# Patient Record
Sex: FEMALE | Race: WHITE | NOT HISPANIC OR LATINO | Employment: PART TIME | URBAN - METROPOLITAN AREA
[De-identification: names, ages, dates, MRNs, and addresses within clinical notes are randomized per-mention and may not be internally consistent; named-entity substitution may affect disease eponyms.]

---

## 2017-05-20 ENCOUNTER — ALLSCRIPTS OFFICE VISIT (OUTPATIENT)
Dept: OTHER | Facility: OTHER | Age: 52
End: 2017-05-20

## 2017-06-14 ENCOUNTER — APPOINTMENT (OUTPATIENT)
Dept: LAB | Facility: MEDICAL CENTER | Age: 52
End: 2017-06-14

## 2017-06-14 ENCOUNTER — APPOINTMENT (OUTPATIENT)
Dept: URGENT CARE | Facility: MEDICAL CENTER | Age: 52
End: 2017-06-14

## 2017-06-14 ENCOUNTER — TRANSCRIBE ORDERS (OUTPATIENT)
Dept: URGENT CARE | Facility: MEDICAL CENTER | Age: 52
End: 2017-06-14

## 2017-06-14 DIAGNOSIS — Z02.1 PHYSICAL EXAM, PRE-EMPLOYMENT: ICD-10-CM

## 2017-06-14 DIAGNOSIS — Z02.1 PHYSICAL EXAM, PRE-EMPLOYMENT: Primary | ICD-10-CM

## 2017-06-14 PROCEDURE — 36415 COLL VENOUS BLD VENIPUNCTURE: CPT

## 2017-06-14 PROCEDURE — 86787 VARICELLA-ZOSTER ANTIBODY: CPT

## 2017-06-14 PROCEDURE — 86480 TB TEST CELL IMMUN MEASURE: CPT

## 2017-06-15 LAB — VZV IGG SER IA-ACNC: NORMAL

## 2017-06-16 LAB
ANNOTATION COMMENT IMP: NORMAL
GAMMA INTERFERON BACKGROUND BLD IA-ACNC: 0.04 IU/ML
M TB IFN-G BLD-IMP: NEGATIVE
M TB IFN-G CD4+ BCKGRND COR BLD-ACNC: 0 IU/ML
M TB IFN-G CD4+ T-CELLS BLD-ACNC: 0.04 IU/ML
MITOGEN IGNF BLD-ACNC: 7.13 IU/ML
QUANTIFERON-TB GOLD IN TUBE: NORMAL
SERVICE CMNT-IMP: NORMAL

## 2018-01-14 VITALS
TEMPERATURE: 98.7 F | OXYGEN SATURATION: 96 % | WEIGHT: 194.31 LBS | RESPIRATION RATE: 16 BRPM | DIASTOLIC BLOOD PRESSURE: 82 MMHG | HEIGHT: 63 IN | BODY MASS INDEX: 34.43 KG/M2 | SYSTOLIC BLOOD PRESSURE: 118 MMHG | HEART RATE: 92 BPM

## 2018-01-16 NOTE — RESULT NOTES
Verified Results  (Q) HEMOGLOBIN A1c WITH eAG 18Oct2016 08:39AM Jose A Groves   REPORT COMMENT:  SPLIT 10/18/2016 FROM [id number]  FASTING:YES     Test Name Result Flag Reference   HEMOGLOBIN A1c 5 4 % of total Hgb  <5 7   According to ADA guidelines, hemoglobin A1c <7 0%  represents optimal control in non-pregnant diabetic  patients  Different metrics may apply to specific  patient populations  Standards of Medical Care in    Diabetes Care  2013;36:s11-s66     For the purpose of screening for the presence of  diabetes  <5 7%       Consistent with the absence of diabetes  5 7-6 4%    Consistent with increased risk for diabetes              (prediabetes)  >or=6 5%    Consistent with diabetes     This assay result is consistent with a decreased risk  of diabetes  Currently, no consensus exists for use of hemoglobin  A1c for diagnosis of diabetes for children  eAG (mg/dL) 108 (calc)     eAG (mmol/L) 6 0 (calc)       (Q) LIPID PANEL WITH REFLEX TO DIRECT LDL [id number] 08:39AM Jose A Groves     Test Name Result Flag Reference   CHOLESTEROL, TOTAL 202 mg/dL H 125-200   HDL CHOLESTEROL 46 mg/dL  > OR = 46   TRIGLICERIDES 959 mg/dL  <150   LDL-CHOLESTEROL 130 mg/dL (calc) H <130   Desirable range <100 mg/dL for patients with CHD or  diabetes and <70 mg/dL for diabetic patients with  known heart disease  CHOL/HDLC RATIO 4 4 (calc)  < OR = 5 0   NON HDL CHOLESTEROL 156 mg/dL (calc)     Target for non-HDL cholesterol is 30 mg/dL higher than   LDL cholesterol target  (Q) COMPREHENSIVE METABOLIC PNL W/ADJUSTED CALCIUM [id number] 08:39AM Jose A Groves     Test Name Result Flag Reference   GLUCOSE 130 mg/dL H 65-99   Fasting reference interval   UREA NITROGEN (BUN) 12 mg/dL  7-25   CREATININE 0 77 mg/dL  0 50-1 10   eGFR NON-AFR   AMERICAN 91 mL/min/1 73m2  > OR = 60   eGFR AFRICAN AMERICAN 105 mL/min/1 73m2  > OR = 60   BUN/CREATININE RATIO   1-40   NOT APPLICABLE (calc)   SODIUM 139 mmol/L  135-146   POTASSIUM 4 4 mmol/L  3 5-5 3   CHLORIDE 104 mmol/L     CARBON DIOXIDE 25 mmol/L  20-31   CALCIUM 9 1 mg/dL  8 6-10 2   CALCIUM (ADJUSTED FOR$ALBUMIN) 9 7 mg/dL (calc)  8 6-10 2   PROTEIN, TOTAL 6 5 g/dL  6 1-8 1   ALBUMIN 3 5 g/dL L 3 6-5 1   GLOBULIN 3 0 g/dL (calc)  1 9-3 7   ALBUMIN/GLOBULIN RATIO 1 2 (calc)  1 0-2 5   BILIRUBIN, TOTAL 0 8 mg/dL  0 2-1 2   ALKALINE PHOSPHATASE 71 U/L     AST 14 U/L  10-35   ALT 18 U/L  6-29     (Q) TSH, 3RD GENERATION W/REFLEX TO FT4 18Oct2016 08:39 [employer]     Test Name Result Flag Reference   TSH W/REFLEX TO FT4 3 35 mIU/L     Reference Range                         > or = 20 Years  0 40-4 50                              Pregnancy Ranges            First trimester    0 26-2 66            Second trimester   0 55-2 73            Third trimester    0 43-2 91     (Q) CBC (INCLUDES DIFF/PLT) (REFL) [id number] 08:39AM [employer]     Test Name Result Flag Reference   WHITE BLOOD CELL COUNT 6 3 Thousand/uL  3 8-10 8   RED BLOOD CELL COUNT 5 12 Million/uL H 3 80-5 10   HEMOGLOBIN 15 7 g/dL H 11 7-15 5   HEMATOCRIT 47 3 % H 35 0-45 0   MCV 92 4 fL  80 0-100 0   MCH 30 6 pg  27 0-33 0   MCHC 33 2 g/dL  32 0-36 0   RDW 13 5 %  11 0-15 0   PLATELET COUNT 379 Thousand/uL  140-400   MPV 9 6 fL  7 5-11 5   ABSOLUTE NEUTROPHILS 4442 cells/uL  4335-4142   ABSOLUTE LYMPHOCYTES 1361 cells/uL  850-3900   ABSOLUTE MONOCYTES 340 cells/uL  200-950   ABSOLUTE EOSINOPHILS 132 cells/uL     ABSOLUTE BASOPHILS 25 cells/uL  0-200   NEUTROPHILS 70 5 %     LYMPHOCYTES 21 6 %     MONOCYTES 5 4 %     EOSINOPHILS 2 1 %     BASOPHILS 0 4 %       (1) OP DAYANA FERRITIN [id number] 08:39 [employer]     Test Name Result Flag Reference   FERRITIN 41 ng/mL

## 2018-03-27 RX ORDER — LEVOTHYROXINE SODIUM 0.05 MG/1
1 TABLET ORAL DAILY
COMMUNITY
Start: 2012-12-28 | End: 2018-03-28 | Stop reason: SDUPTHER

## 2018-03-27 RX ORDER — LEVOTHYROXINE SODIUM 0.05 MG/1
50 TABLET ORAL DAILY
Qty: 30 TABLET | Refills: 0 | Status: CANCELLED | OUTPATIENT
Start: 2018-03-27

## 2018-03-28 DIAGNOSIS — E03.9 HYPOTHYROIDISM, UNSPECIFIED TYPE: Primary | ICD-10-CM

## 2018-03-28 RX ORDER — LEVOTHYROXINE SODIUM 0.05 MG/1
50 TABLET ORAL DAILY
Qty: 30 TABLET | Refills: 3 | Status: SHIPPED | OUTPATIENT
Start: 2018-03-28 | End: 2018-09-04 | Stop reason: SDUPTHER

## 2018-05-22 ENCOUNTER — OFFICE VISIT (OUTPATIENT)
Dept: OBGYN CLINIC | Facility: MEDICAL CENTER | Age: 53
End: 2018-05-22
Payer: COMMERCIAL

## 2018-05-22 VITALS — WEIGHT: 201 LBS | DIASTOLIC BLOOD PRESSURE: 76 MMHG | SYSTOLIC BLOOD PRESSURE: 110 MMHG | BODY MASS INDEX: 36.18 KG/M2

## 2018-05-22 DIAGNOSIS — N95.1 PERIMENOPAUSE: ICD-10-CM

## 2018-05-22 DIAGNOSIS — Z12.4 ENCOUNTER FOR PAPANICOLAOU SMEAR FOR CERVICAL CANCER SCREENING: ICD-10-CM

## 2018-05-22 DIAGNOSIS — Z11.51 SCREENING FOR HPV (HUMAN PAPILLOMAVIRUS): ICD-10-CM

## 2018-05-22 DIAGNOSIS — Z01.419 ENCOUNTER FOR WELL WOMAN EXAM WITH ROUTINE GYNECOLOGICAL EXAM: Primary | ICD-10-CM

## 2018-05-22 DIAGNOSIS — Z12.39 SCREENING FOR MALIGNANT NEOPLASM OF BREAST: ICD-10-CM

## 2018-05-22 PROCEDURE — 87624 HPV HI-RISK TYP POOLED RSLT: CPT | Performed by: OBSTETRICS & GYNECOLOGY

## 2018-05-22 PROCEDURE — G0145 SCR C/V CYTO,THINLAYER,RESCR: HCPCS | Performed by: OBSTETRICS & GYNECOLOGY

## 2018-05-22 PROCEDURE — S0610 ANNUAL GYNECOLOGICAL EXAMINA: HCPCS | Performed by: OBSTETRICS & GYNECOLOGY

## 2018-05-22 NOTE — PROGRESS NOTES
ASSESSMENT & PLAN: Erich Shankar is a 47 y o  [id number] with normal gynecologic exam     1   Routine well woman exam done today  2  Pap and HPV:  The patient's Pap and cotesting was done today  Current ASCCP Guidelines reviewed  Last done   3  Mammogram ordered  4  Colonoscopy - needed  5  The following were reviewed in today's visit: breast self exam and mammography screening ordered      CC:  Annual Gynecologic Examination    HPI: Erich Shankar is a 47 y o  [id number] who presents for annual gynecologic examination  She has the following concerns:  No GYN complaints, perimenopausal  Cycles every 2-3 months  Occasional hot flashes and night sweats  Tolerating well  Health Maintenance:    She wears her seatbelt routinely  She does perform irregular monthly self breast exams  She feels safe at home  Pap:   Last mammogram: unsure  Last colonoscopy: never    Past Medical History:   Diagnosis Date    Allergic rhinitis     Last Assessed:2012    Myxedema     Last Assessed:2013       History reviewed  No pertinent surgical history  Past OB/Gyn History:  OB History      Para Term  AB Living    2 2 2     2    SAB TAB Ectopic Multiple Live Births            2        Pt does not have menstrual issues  perimenopause   History of sexually transmitted infection: No   History of abnormal pap smears: No      Patient is currently sexually active  heterosexual   The current method of family planning is vasectomy  Family History   Problem Relation Age of Onset    No Known Problems Mother     No Known Problems Father        Social History:  Social History     Social History    Marital status: /Civil Union     Spouse name: N/A    Number of children: N/A    Years of education: N/A     Occupational History    Not on file       Social History Main Topics    Smoking status: Never Smoker    Smokeless tobacco: Never Used    Alcohol use Yes    Drug use: No    Sexual activity: Yes     Partners: Male     Birth control/ protection: Male Sterilization     Other Topics Concern    Not on file     Social History Narrative    No narrative on file     Presently lives with   Patient is currently employed [employer]  Allergies   Allergen Reactions    Codeine GI Intolerance    Tramadol GI Intolerance       Current Outpatient Prescriptions:     levothyroxine 50 mcg tablet, Take 1 tablet (50 mcg total) by mouth daily, Disp: 30 tablet, Rfl: 3      Review of Systems  Constitutional :no fever, feels well, no tiredness, no recent weight gain or loss  ENT: no ear ache, no loss of hearing, no nosebleeds or nasal discharge, no sore throat or hoarseness  Cardiovascular: no complaints of slow or fast heart beat, no chest pain, no palpitations, no leg claudication or lower extremity edema  Respiratory: no complaints of shortness of shortness of breath, no MORENO  Breasts:no complaints of breast pain, breast lump, or nipple discharge  Gastrointestinal: no complaints of abdominal pain, constipation, nausea, vomiting, or diarrhea or bloody stools  Genitourinary : no complaints of dysuria, incontinence, pelvic pain, no dysmenorrhea, vaginal discharge or abnormal vaginal bleeding and as noted in HPI  Musculoskeletal: no complaints of arthralgia, no myalgia, no joint swelling or stiffness, no limb pain or swelling    Integumentary: no complaints of skin rash or lesion, itching or dry skin  Neurological: no complaints of headache, no confusion, no numbness or tingling, no dizziness or fainting    Objective      /76   Wt 91 2 kg (201 lb)   LMP 03/13/2018   BMI 36 18 kg/m²     General:   appears stated age, cooperative, alert normal mood and affect   Neck: normal, supple,trachea midline, no masses   Heart: regular rate and rhythm, S1, S2 normal, no murmur, click, rub or gallop   Lungs: clear to auscultation bilaterally   Breasts: normal appearance, no masses or tenderness, Inspection negative, No nipple retraction or dimpling, No nipple discharge or bleeding, No axillary or supraclavicular adenopathy   Abdomen: soft, non-tender, without masses or organomegaly   Vulva: normal, normal female genitalia, Bartholin's, Urethra, Underwood normal, no lesions   Vagina: normal vagina, no discharge, exudate, lesion, or erythema   Urethra: normal   Cervix: Normal, no discharge  PAP done  Uterus: normal size, contour, position, consistency, mobility, non-tender   Adnexa: normal adnexa and no mass, fullness, tenderness   Lymphatic palpation of lymph nodes in neck, axilla, groin and/or other locations: no lymphadenopathy or masses noted   Skin normal skin turgor and no rashes     Psychiatric orientation to person, place, and time: normal  mood and affect: normal

## 2018-05-24 LAB
HPV RRNA GENITAL QL NAA+PROBE: NORMAL
LAB AP GYN PRIMARY INTERPRETATION: NORMAL
Lab: NORMAL

## 2018-06-14 ENCOUNTER — HOSPITAL ENCOUNTER (OUTPATIENT)
Dept: MAMMOGRAPHY | Facility: MEDICAL CENTER | Age: 53
Discharge: HOME | End: 2018-06-14
Payer: COMMERCIAL

## 2018-06-14 DIAGNOSIS — Z12.39 SCREENING FOR MALIGNANT NEOPLASM OF BREAST: ICD-10-CM

## 2018-06-14 PROCEDURE — 77067 SCR MAMMO BI INCL CAD: CPT

## 2018-06-14 PROCEDURE — 77063 BREAST TOMOSYNTHESIS BI: CPT

## 2018-09-04 DIAGNOSIS — R73.9 ELEVATED BLOOD SUGAR: ICD-10-CM

## 2018-09-04 DIAGNOSIS — E78.2 MIXED HYPERLIPIDEMIA: ICD-10-CM

## 2018-09-04 DIAGNOSIS — E03.9 ACQUIRED HYPOTHYROIDISM: Primary | ICD-10-CM

## 2018-09-04 DIAGNOSIS — E03.9 HYPOTHYROIDISM, UNSPECIFIED TYPE: ICD-10-CM

## 2018-09-04 RX ORDER — LEVOTHYROXINE SODIUM 0.05 MG/1
50 TABLET ORAL DAILY
Qty: 90 TABLET | Refills: 1 | Status: SHIPPED | OUTPATIENT
Start: 2018-09-04 | End: 2019-07-18 | Stop reason: SDUPTHER

## 2018-09-04 NOTE — TELEPHONE ENCOUNTER
CALL PATIENT  I DO NOT SEE THAT SHE HAS HAD FASTING BLOOD WORK OR A TSH IN THE PAST 6 MONTHS  I WILL PUT ORDERS IN AND THEN SHE CAN MAKE AN APPOINTMENT TO SEE ME AFTER SHE HAS THE BLOOD WORK

## 2018-11-19 ENCOUNTER — OFFICE VISIT (OUTPATIENT)
Dept: FAMILY MEDICINE CLINIC | Facility: CLINIC | Age: 53
End: 2018-11-19
Payer: COMMERCIAL

## 2018-11-19 VITALS
OXYGEN SATURATION: 98 % | SYSTOLIC BLOOD PRESSURE: 144 MMHG | DIASTOLIC BLOOD PRESSURE: 98 MMHG | WEIGHT: 208.5 LBS | HEART RATE: 106 BPM | HEIGHT: 63 IN | TEMPERATURE: 98.7 F | BODY MASS INDEX: 36.94 KG/M2

## 2018-11-19 DIAGNOSIS — T16.1XXA FOREIGN BODY OF RIGHT EAR, INITIAL ENCOUNTER: Primary | ICD-10-CM

## 2018-11-19 DIAGNOSIS — Z12.11 SCREENING FOR COLON CANCER: ICD-10-CM

## 2018-11-19 PROCEDURE — 3008F BODY MASS INDEX DOCD: CPT | Performed by: PHYSICIAN ASSISTANT

## 2018-11-19 PROCEDURE — 99213 OFFICE O/P EST LOW 20 MIN: CPT | Performed by: PHYSICIAN ASSISTANT

## 2018-11-19 PROCEDURE — 1036F TOBACCO NON-USER: CPT | Performed by: PHYSICIAN ASSISTANT

## 2018-11-19 RX ORDER — CHLORAL HYDRATE 500 MG
1000 CAPSULE ORAL DAILY
COMMUNITY

## 2018-11-19 RX ORDER — LANOLIN ALCOHOL/MO/W.PET/CERES
1 CREAM (GRAM) TOPICAL 2 TIMES DAILY
COMMUNITY

## 2018-11-19 RX ORDER — MULTIVITAMIN
1 CAPSULE ORAL DAILY
COMMUNITY

## 2019-03-27 ENCOUNTER — OFFICE VISIT (OUTPATIENT)
Dept: FAMILY MEDICINE CLINIC | Facility: CLINIC | Age: 54
End: 2019-03-27
Payer: COMMERCIAL

## 2019-03-27 VITALS
OXYGEN SATURATION: 98 % | TEMPERATURE: 98.1 F | SYSTOLIC BLOOD PRESSURE: 130 MMHG | HEART RATE: 96 BPM | RESPIRATION RATE: 16 BRPM | DIASTOLIC BLOOD PRESSURE: 86 MMHG
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
## 2019-03-27 PROCEDURE — 87205 SMEAR GRAM STAIN: CPT | Performed by: FAMILY MEDICINE

## 2019-03-27 PROCEDURE — 99213 OFFICE O/P EST LOW 20 MIN: CPT | Performed by: FAMILY MEDICINE

## 2019-03-27 PROCEDURE — 87070 CULTURE OTHR SPECIMN AEROBIC: CPT | Performed by: FAMILY MEDICINE

## 2019-03-27 RX ORDER — CEPHALEXIN 500 MG/1
500 CAPSULE ORAL EVERY 12 HOURS SCHEDULED
Qty: 20 CAPSULE | Refills: 0 | Status: SHIPPED | OUTPATIENT
Start: 2019-03-27 | End: 2019-04-06

## 2019-03-27 NOTE — PROGRESS NOTES
Assessment/Plan:  Patient is a 56-year-old female seen for a dog bite  Patient was been about 10 days ago by a neighbor's dog  She and her  were out for a walk and the dog came from behind the fence  Patient started walking towards the dog's owner and she believes in attempt to protect his own her, the dog bit her hand  She has a small open area approximately 6 mm in diameter is with slight yellowish drainage and erythema surrounding it  I did send out a culture and I started patient on antibiotics  Diagnoses and all orders for this visit:    Dog bite, initial encounter  -     cephalexin (KEFLEX) 500 mg capsule; Take 1 capsule (500 mg total) by mouth every 12 (twelve) hours for 10 days  -     Wound culture and Gram stain          Subjective:      Patient ID: Robyn Banegas is a 47 y o  female  Patient was walking and a dog got out from fence  Dog bit her on 3/17  Her hand was just red  The following portions of the patient's history were reviewed and updated as appropriate: allergies, current medications, past family history, past medical history, past social history, past surgical history and problem list     Review of Systems   Constitutional: Negative for chills and fever  Skin: Positive for wound  Neurological: Negative for weakness and numbness  Objective:      /86 (BP Location: Left arm, Patient Position: Sitting)   Pulse 96   Temp 98 1 °F (36 7 °C) (Tympanic)   Resp 16   LMP 03/13/2018   SpO2 98%          Physical Exam   Constitutional: She is oriented to person, place, and time  No distress  Musculoskeletal: Normal range of motion  She exhibits tenderness (Over open wound)  Neurological: She is alert and oriented to person, place, and time  Skin: Skin is warm and dry  Open area on left leg  Some drainage with erythema around it  Psychiatric: She has a normal mood and affect  Nursing note and vitals reviewed

## 2019-03-30 LAB
BACTERIA WND AEROBE CULT: NO GROWTH
GRAM STN SPEC: NORMAL
GRAM STN SPEC: NORMAL

## 2019-04-17 ENCOUNTER — TELEPHONE (OUTPATIENT)
Dept: FAMILY MEDICINE CLINIC | Facility: CLINIC | Age: 54
End: 2019-04-17

## 2019-04-18 DIAGNOSIS — N95.1 PERIMENOPAUSE: Primary | ICD-10-CM

## 2019-04-18 DIAGNOSIS — R53.83 FATIGUE, UNSPECIFIED TYPE: ICD-10-CM

## 2019-04-24 ENCOUNTER — TRANSCRIBE ORDERS (OUTPATIENT)
Dept: LAB | Facility: HOSPITAL | Age: 54
End: 2019-04-24

## 2019-04-24 DIAGNOSIS — R73.9 BLOOD GLUCOSE ELEVATED: Primary | ICD-10-CM

## 2019-05-03 ENCOUNTER — APPOINTMENT (OUTPATIENT)
Dept: LAB | Facility: HOSPITAL | Age: 54
End: 2019-05-03
Payer: COMMERCIAL

## 2019-05-03 DIAGNOSIS — R73.9 BLOOD GLUCOSE ELEVATED: ICD-10-CM

## 2019-05-03 DIAGNOSIS — N95.1 PERIMENOPAUSE: ICD-10-CM

## 2019-05-03 DIAGNOSIS — E78.2 MIXED HYPERLIPIDEMIA: ICD-10-CM

## 2019-05-03 DIAGNOSIS — R53.83 FATIGUE, UNSPECIFIED TYPE: ICD-10-CM

## 2019-05-03 DIAGNOSIS — E03.9 ACQUIRED HYPOTHYROIDISM: ICD-10-CM

## 2019-05-03 DIAGNOSIS — R73.9 ELEVATED BLOOD SUGAR: ICD-10-CM

## 2019-05-03 LAB
25(OH)D3 SERPL-MCNC: 29 NG/ML (ref 30–100)
ALBUMIN SERPL BCP-MCNC: 3.3 G/DL (ref 3.5–5)
ALP SERPL-CCNC: 101 U/L (ref 46–116)
ALT SERPL W P-5'-P-CCNC: 32 U/L (ref 12–78)
ANION GAP SERPL CALCULATED.3IONS-SCNC: 9 MMOL/L (ref 4–13)
AST SERPL W P-5'-P-CCNC: 21 U/L (ref 5–45)
BASOPHILS # BLD AUTO: 0.04 THOUSANDS/ΜL (ref 0–0.1)
BASOPHILS NFR BLD AUTO: 1 % (ref 0–1)
BILIRUB SERPL-MCNC: 1.21 MG/DL (ref 0.2–1)
BUN SERPL-MCNC: 10 MG/DL (ref 5–25)
CALCIUM SERPL-MCNC: 8.8 MG/DL (ref 8.3–10.1)
CHLORIDE SERPL-SCNC: 107 MMOL/L (ref 100–108)
CHOLEST SERPL-MCNC: 255 MG/DL (ref 50–200)
CO2 SERPL-SCNC: 24 MMOL/L (ref 21–32)
CREAT SERPL-MCNC: 0.81 MG/DL (ref 0.6–1.3)
EOSINOPHIL # BLD AUTO: 0.14 THOUSAND/ΜL (ref 0–0.61)
EOSINOPHIL NFR BLD AUTO: 2 % (ref 0–6)
ERYTHROCYTE [DISTWIDTH] IN BLOOD BY AUTOMATED COUNT: 13.6 % (ref 11.6–15.1)
EST. AVERAGE GLUCOSE BLD GHB EST-MCNC: 111 MG/DL
GFR SERPL CREATININE-BSD FRML MDRD: 84 ML/MIN/1.73SQ M
GLUCOSE SERPL-MCNC: 101 MG/DL (ref 65–140)
HBA1C MFR BLD: 5.5 % (ref 4.2–6.3)
HCT VFR BLD AUTO: 48.6 % (ref 34.8–46.1)
HDLC SERPL-MCNC: 50 MG/DL (ref 40–60)
HGB BLD-MCNC: 16.1 G/DL (ref 11.5–15.4)
IMM GRANULOCYTES # BLD AUTO: 0.02 THOUSAND/UL (ref 0–0.2)
IMM GRANULOCYTES NFR BLD AUTO: 0 % (ref 0–2)
LDLC SERPL CALC-MCNC: 182 MG/DL (ref 0–100)
LYMPHOCYTES # BLD AUTO: 1.52 THOUSANDS/ΜL (ref 0.6–4.47)
LYMPHOCYTES NFR BLD AUTO: 24 % (ref 14–44)
MCH RBC QN AUTO: 30.3 PG (ref 26.8–34.3)
MCHC RBC AUTO-ENTMCNC: 33.1 G/DL (ref 31.4–37.4)
MCV RBC AUTO: 91 FL (ref 82–98)
MONOCYTES # BLD AUTO: 0.44 THOUSAND/ΜL (ref 0.17–1.22)
MONOCYTES NFR BLD AUTO: 7 % (ref 4–12)
NEUTROPHILS # BLD AUTO: 4.06 THOUSANDS/ΜL (ref 1.85–7.62)
NEUTS SEG NFR BLD AUTO: 66 % (ref 43–75)
NRBC BLD AUTO-RTO: 0 /100 WBCS
PLATELET # BLD AUTO: 303 THOUSANDS/UL (ref 149–390)
PMV BLD AUTO: 11 FL (ref 8.9–12.7)
POTASSIUM SERPL-SCNC: 3.7 MMOL/L (ref 3.5–5.3)
PROT SERPL-MCNC: 7.4 G/DL (ref 6.4–8.2)
RBC # BLD AUTO: 5.32 MILLION/UL (ref 3.81–5.12)
SODIUM SERPL-SCNC: 140 MMOL/L (ref 136–145)
TRIGL SERPL-MCNC: 115 MG/DL
TSH SERPL DL<=0.05 MIU/L-ACNC: 2.5 UIU/ML (ref 0.36–3.74)
VENIPUNCTURE: NORMAL
WBC # BLD AUTO: 6.22 THOUSAND/UL (ref 4.31–10.16)

## 2019-05-03 PROCEDURE — 84443 ASSAY THYROID STIM HORMONE: CPT

## 2019-05-03 PROCEDURE — 36415 COLL VENOUS BLD VENIPUNCTURE: CPT

## 2019-05-03 PROCEDURE — 82306 VITAMIN D 25 HYDROXY: CPT

## 2019-05-03 PROCEDURE — 80053 COMPREHEN METABOLIC PANEL: CPT

## 2019-05-03 PROCEDURE — 83036 HEMOGLOBIN GLYCOSYLATED A1C: CPT

## 2019-05-03 PROCEDURE — 80061 LIPID PANEL: CPT

## 2019-05-03 PROCEDURE — 85025 COMPLETE CBC W/AUTO DIFF WBC: CPT

## 2019-05-14 ENCOUNTER — OFFICE VISIT (OUTPATIENT)
Dept: FAMILY MEDICINE CLINIC | Facility: CLINIC | Age: 54
End: 2019-05-14
Payer: COMMERCIAL

## 2019-05-14 VITALS
RESPIRATION RATE: 16 BRPM | WEIGHT: 204.2 LBS | HEIGHT: 63 IN | OXYGEN SATURATION: 98 % | HEART RATE: 88 BPM | DIASTOLIC BLOOD PRESSURE: 88 MMHG | SYSTOLIC BLOOD PRESSURE: 136 MMHG | BODY MASS INDEX: 36.18 KG/M2 | TEMPERATURE: 79.9 F

## 2019-05-14 DIAGNOSIS — M54.50 ACUTE BILATERAL LOW BACK PAIN WITHOUT SCIATICA: ICD-10-CM

## 2019-05-14 DIAGNOSIS — R31.29 OTHER MICROSCOPIC HEMATURIA: ICD-10-CM

## 2019-05-14 DIAGNOSIS — R10.9 RIGHT FLANK PAIN: Primary | ICD-10-CM

## 2019-05-14 DIAGNOSIS — R10.9 ABDOMINAL CRAMPING: ICD-10-CM

## 2019-05-14 LAB
SL AMB  POCT GLUCOSE, UA: ABNORMAL
SL AMB LEUKOCYTE ESTERASE,UA: ABNORMAL
SL AMB POCT BILIRUBIN,UA: ABNORMAL
SL AMB POCT BLOOD,UA: ABNORMAL
SL AMB POCT CLARITY,UA: CLEAR
SL AMB POCT COLOR,UA: YELLOW
SL AMB POCT KETONES,UA: ABNORMAL
SL AMB POCT NITRITE,UA: ABNORMAL
SL AMB POCT PH,UA: 7
SL AMB POCT SPECIFIC GRAVITY,UA: 1.01
SL AMB POCT URINE PROTEIN: ABNORMAL
SL AMB POCT UROBILINOGEN: 0.2

## 2019-05-14 PROCEDURE — 81003 URINALYSIS AUTO W/O SCOPE: CPT | Performed by: FAMILY MEDICINE

## 2019-05-14 PROCEDURE — 99213 OFFICE O/P EST LOW 20 MIN: CPT | Performed by: FAMILY MEDICINE

## 2019-05-17 ENCOUNTER — HOSPITAL ENCOUNTER (OUTPATIENT)
Dept: RADIOLOGY | Facility: IMAGING CENTER | Age: 54
Discharge: HOME | End: 2019-05-17
Payer: COMMERCIAL

## 2019-05-17 DIAGNOSIS — R31.29 OTHER MICROSCOPIC HEMATURIA: ICD-10-CM

## 2019-05-17 DIAGNOSIS — R10.9 RIGHT FLANK PAIN: ICD-10-CM

## 2019-05-17 PROCEDURE — 74176 CT ABD & PELVIS W/O CONTRAST: CPT

## 2019-05-23 ENCOUNTER — TELEPHONE (OUTPATIENT)
Dept: FAMILY MEDICINE CLINIC | Facility: CLINIC | Age: 54
End: 2019-05-23

## 2019-06-03 ENCOUNTER — OFFICE VISIT (OUTPATIENT)
Dept: FAMILY MEDICINE CLINIC | Facility: CLINIC | Age: 54
End: 2019-06-03
Payer: COMMERCIAL

## 2019-06-03 VITALS
WEIGHT: 204 LBS | RESPIRATION RATE: 17 BRPM | TEMPERATURE: 98.1 F | HEART RATE: 94 BPM | OXYGEN SATURATION: 96 % | BODY MASS INDEX: 36.14 KG/M2 | HEIGHT: 63 IN | DIASTOLIC BLOOD PRESSURE: 86 MMHG | SYSTOLIC BLOOD PRESSURE: 142 MMHG

## 2019-06-03 DIAGNOSIS — J01.90 ACUTE BACTERIAL SINUSITIS: Primary | ICD-10-CM

## 2019-06-03 DIAGNOSIS — B96.89 ACUTE BACTERIAL SINUSITIS: Primary | ICD-10-CM

## 2019-06-03 PROCEDURE — 3008F BODY MASS INDEX DOCD: CPT | Performed by: FAMILY MEDICINE

## 2019-06-03 PROCEDURE — 99213 OFFICE O/P EST LOW 20 MIN: CPT | Performed by: FAMILY MEDICINE

## 2019-06-03 PROCEDURE — 1036F TOBACCO NON-USER: CPT | Performed by: FAMILY MEDICINE

## 2019-06-03 RX ORDER — CEFDINIR 300 MG/1
300 CAPSULE ORAL EVERY 12 HOURS SCHEDULED
Qty: 20 CAPSULE | Refills: 0 | Status: SHIPPED | OUTPATIENT
Start: 2019-06-03 | End: 2019-06-13

## 2019-06-03 RX ORDER — PREDNISONE 10 MG/1
TABLET ORAL
Qty: 20 TABLET | Refills: 0 | Status: SHIPPED | OUTPATIENT
Start: 2019-06-03 | End: 2019-07-16 | Stop reason: ALTCHOICE

## 2019-07-16 ENCOUNTER — OFFICE VISIT (OUTPATIENT)
Dept: URGENT CARE | Facility: CLINIC | Age: 54
End: 2019-07-16
Payer: COMMERCIAL

## 2019-07-16 VITALS
DIASTOLIC BLOOD PRESSURE: 88 MMHG | HEART RATE: 90 BPM | SYSTOLIC BLOOD PRESSURE: 152 MMHG | RESPIRATION RATE: 18 BRPM | OXYGEN SATURATION: 98 % | TEMPERATURE: 98.3 F

## 2019-07-16 DIAGNOSIS — Z12.12 SCREENING FOR COLORECTAL CANCER: ICD-10-CM

## 2019-07-16 DIAGNOSIS — Z12.11 SCREENING FOR COLORECTAL CANCER: ICD-10-CM

## 2019-07-16 DIAGNOSIS — N30.00 ACUTE CYSTITIS WITHOUT HEMATURIA: Primary | ICD-10-CM

## 2019-07-16 LAB
SL AMB  POCT GLUCOSE, UA: NEGATIVE
SL AMB LEUKOCYTE ESTERASE,UA: ABNORMAL
SL AMB POCT BILIRUBIN,UA: NEGATIVE
SL AMB POCT BLOOD,UA: ABNORMAL
SL AMB POCT CLARITY,UA: CLEAR
SL AMB POCT COLOR,UA: YELLOW
SL AMB POCT KETONES,UA: NEGATIVE
SL AMB POCT NITRITE,UA: NEGATIVE
SL AMB POCT PH,UA: 6
SL AMB POCT SPECIFIC GRAVITY,UA: 1.01
SL AMB POCT URINE PROTEIN: NEGATIVE
SL AMB POCT UROBILINOGEN: 0.2

## 2019-07-16 PROCEDURE — 81002 URINALYSIS NONAUTO W/O SCOPE: CPT | Performed by: PHYSICIAN ASSISTANT

## 2019-07-16 PROCEDURE — 87086 URINE CULTURE/COLONY COUNT: CPT | Performed by: PHYSICIAN ASSISTANT

## 2019-07-16 PROCEDURE — 99213 OFFICE O/P EST LOW 20 MIN: CPT | Performed by: PHYSICIAN ASSISTANT

## 2019-07-16 RX ORDER — CEPHALEXIN 500 MG/1
500 CAPSULE ORAL EVERY 12 HOURS SCHEDULED
Qty: 21 CAPSULE | Refills: 0 | Status: SHIPPED | COMMUNITY
Start: 2019-07-16 | End: 2019-07-18 | Stop reason: ALTCHOICE

## 2019-07-16 NOTE — PROGRESS NOTES
[facility]        NAME: Justyna Sanford is a 47 y o  female  : 1965    MRN: [id number]  DATE: 2019  TIME: 5:27 PM    Assessment and Plan   Acute cystitis without hematuria [N30 00]  1  Acute cystitis without hematuria  POCT urine dip    Urine culture    cephalexin (KEFLEX) 500 mg capsule   2  Screening for colorectal cancer  Occult Blood, Fecal Immunochemical (FIT)    CANCELED: Occult Blood, Fecal Immunochemical (FIT)         Patient Instructions      take Keflex as directed until completed   Motrin and/or Tylenol as needed for fevers aches and pains   drink plenty of fluids and stay well hydrated  Follow up with PCP in 3-5 days  Proceed to  ER if symptoms worsen  Chief Complaint     Chief Complaint   Patient presents with    Possible UTI     Patient states that she has pelvic pressure, urinary frequency  Suprapubic discomfort  Bilat low back pain  Has appt with pcp in 2 days  History of Present Illness        60-year-old female presents with several day history of lower pelvic pain urinary frequency urgency and some diffuse lower back pain  Denies any fevers or chills  No chest pain shortness of breath or coughing  No nausea vomiting or diarrhea  Urinary Frequency    This is a new problem  The current episode started in the past 7 days  The problem occurs every urination  The problem has been waxing and waning  The quality of the pain is described as burning  The pain is mild  There has been no fever  There is no history of pyelonephritis  Associated symptoms include frequency and urgency  Pertinent negatives include no discharge, hematuria, hesitancy, nausea or vomiting  She has tried nothing for the symptoms  The treatment provided no relief  Review of Systems   Review of Systems   Constitutional: Negative  HENT: Negative  Eyes: Negative  Respiratory: Negative  Cardiovascular: Negative  Gastrointestinal: Negative    Negative for nausea and vomiting  Genitourinary: Positive for frequency and urgency  Negative for hematuria and hesitancy  Musculoskeletal: Negative  Skin: Negative  Neurological: Negative  Current Medications       Current Outpatient Medications:     calcium citrate-vitamin D (CITRACAL+D) 315-200 MG-UNIT per tablet, Take 1 tablet by mouth 2 (two) times a day, Disp: , Rfl:     levothyroxine 50 mcg tablet, Take 1 tablet (50 mcg total) by mouth daily, Disp: 90 tablet, Rfl: 1    Multiple Vitamin (MULTIVITAMIN) capsule, Take 1 capsule by mouth daily, Disp: , Rfl:     Omega-3 Fatty Acids (FISH OIL) 1,000 mg, Take 1,000 mg by mouth daily, Disp: , Rfl:     cephalexin (KEFLEX) 500 mg capsule, Take 1 capsule (500 mg total) by mouth every 12 (twelve) hours for 7 days, Disp: 21 capsule, Rfl: 0    Current Allergies     Allergies as of 07/16/2019 - Reviewed 07/16/2019   Allergen Reaction Noted    Codeine GI Intolerance 12/08/2015    Tramadol GI Intolerance 08/01/2013            The following portions of the patient's history were reviewed and updated as appropriate: allergies, current medications, past family history, past medical history, past social history, past surgical history and problem list      Past Medical History:   Diagnosis Date    Allergic rhinitis     Last Assessed:9/13/2012    Myxedema     Last Assessed:1/8/2013       History reviewed  No pertinent surgical history  Family History   Problem Relation Age of Onset    No Known Problems Mother     No Known Problems Father          Medications have been verified  Objective   /88   Pulse 90   Temp 98 3 °F (36 8 °C) (Tympanic)   Resp 18   LMP 03/13/2018   SpO2 98%        Physical Exam     Physical Exam   Constitutional: She is oriented to person, place, and time  She appears well-developed and well-nourished  No distress  HENT:   Head: Normocephalic and atraumatic     Right Ear: External ear normal    Left Ear: External ear normal    Nose: Nose normal    Mouth/Throat: Oropharynx is clear and moist  No oropharyngeal exudate  Eyes: Conjunctivae and EOM are normal  Right eye exhibits no discharge  Left eye exhibits no discharge  Neck: Normal range of motion  Neck supple  Cardiovascular: Normal rate, regular rhythm, normal heart sounds and intact distal pulses  No murmur heard  Pulmonary/Chest: Effort normal and breath sounds normal  No respiratory distress  She has no wheezes  She has no rales  Abdominal: Soft  Bowel sounds are normal  There is tenderness ( mild to moderate) in the suprapubic area  There is no rigidity, no rebound, no guarding, no CVA tenderness, no tenderness at McBurney's point and negative Dennis's sign  Musculoskeletal: Normal range of motion  Lymphadenopathy:     She has no cervical adenopathy  Neurological: She is alert and oriented to person, place, and time  Skin: Skin is warm and dry  Psychiatric: She has a normal mood and affect  Nursing note and vitals reviewed

## 2019-07-16 NOTE — PATIENT INSTRUCTIONS
take Keflex as directed until completed   Motrin and/or Tylenol as needed for fevers aches and pains   drink plenty of fluids and stay well hydrated  Follow up with PCP in 3-5 days  Proceed to  ER if symptoms worsen  Urinary Tract Infection in Women   AMBULATORY CARE:   A urinary tract infection (UTI)  is caused by bacteria that get inside your urinary tract  Most bacteria that enter your urinary tract come out when you urinate  If the bacteria stay in your urinary tract, you may get an infection  Your urinary tract includes your kidneys, ureters, bladder, and urethra  Urine is made in your kidneys, and it flows from the ureters to the bladder  Urine leaves the bladder through the urethra  A UTI is more common in your lower urinary tract, which includes your bladder and urethra  Common symptoms include the following:   · Urinating more often or waking from sleep to urinate    · Pain or burning when you urinate    · Pain or pressure in your lower abdomen     · Urine that smells bad    · Blood in your urine    · Leaking urine  Seek care immediately if:   · You are urinating very little or not at all  · You have a high fever with shaking chills  · You have side or back pain that gets worse  Contact your healthcare provider if:   · You have a fever  · You do not feel better after 2 days of taking antibiotics  · You are vomiting  · You have questions or concerns about your condition or care  Treatment for a UTI  may include medicines to treat a bacterial infection  You may also need medicines to decrease pain and burning, or decrease the urge to urinate often  Prevent a UTI:   · Empty your bladder often  Urinate and empty your bladder as soon as you feel the need  Do not hold your urine for long periods of time  · Wipe from front to back after you urinate or have a bowel movement  This will help prevent germs from getting into your urinary tract through your urethra      · Drink liquids as directed  Ask how much liquid to drink each day and which liquids are best for you  You may need to drink more liquids than usual to help flush out the bacteria  Do not drink alcohol, caffeine, or citrus juices  These can irritate your bladder and increase your symptoms  Your healthcare provider may recommend cranberry juice to help prevent a UTI  · Urinate after you have sex  This can help flush out bacteria passed during sex  · Do not douche or use feminine deodorants  These can change the chemical balance in your vagina  · Change sanitary pads or tampons often  This will help prevent germs from getting into your urinary tract  · Do pelvic muscle exercises often  Pelvic muscle exercises may help you start and stop urinating  Strong pelvic muscles may help you empty your bladder easier  Squeeze these muscles tightly for 5 seconds like you are trying to hold back urine  Then relax for 5 seconds  Gradually work up to squeezing for 10 seconds  Do 3 sets of 15 repetitions a day, or as directed  Follow up with your healthcare provider as directed:  Write down your questions so you remember to ask them during your visits  © 2017 [address] [facility] Information is for End User's use only and may not be sold, redistributed or otherwise used for commercial purposes  All illustrations and images included in CareNotes® are the copyrighted property of A D A M , Inc  or [employer]  The above information is an  only  It is not intended as medical advice for individual conditions or treatments  Talk to your doctor, nurse or pharmacist before following any medical regimen to see if it is safe and effective for you  Cephalexin (By mouth)   Cephalexin ([id number])  Treats infections  This medicine is a cephalosporin antibiotic  Brand Name(s): Daxbia, Keflex   There may be other brand names for this medicine  When This Medicine Should Not Be Used:    This medicine is not right for everyone  Do not use this medicine if you had an allergic reaction to cephalexin or another cephalosporin medicine  How to Use This Medicine:   Capsule, Tablet, Tablet for Suspension, Liquid  · Your doctor will tell you how much medicine to use  Do not use more than directed  · Read and follow the patient instructions that come with this medicine  Talk to your doctor or pharmacist if you have any questions  · You may take your medicine with food or milk to avoid stomach upset  · Oral liquid: Shake well just before use  Measure the oral liquid medicine with a marked measuring spoon, oral syringe, or medicine cup  · Take all of the medicine in your prescription to clear up your infection, even if you feel better after the first few doses  · Missed dose: Take a dose as soon as you remember  If it is almost time for your next dose, wait until then and take a regular dose  Do not take extra medicine to make up for a missed dose  · Capsule or tablet: Store at room temperature away from heat, moisture, and direct light  · Oral liquid: Store in the refrigerator for 14 days  After 14 days, throw away any unused medicine  Do not freeze  Drugs and Foods to Avoid:   Ask your doctor or pharmacist before using any other medicine, including over-the-counter medicines, vitamins, and herbal products  · Some medicines and foods can affect how cephalexin works  Tell your doctor if you are also using  ¨ Metformin  ¨ Probenecid  Warnings While Using This Medicine:   · Tell your doctor if you are pregnant or breastfeeding, or if you have kidney disease, liver disease, or a history of digestive problems, such as colitis  Tell your doctor if you are allergic to penicillin  · This medicine can cause diarrhea  Call your doctor if the diarrhea becomes severe, does not stop, or is bloody  Do not take any medicine to stop diarrhea until you have talked to your doctor   Diarrhea can occur 2 months or more after you stop taking this medicine  · Tell any doctor or dentist who treats you that you are using this medicine  This medicine may affect certain medical test results  · Call your doctor if your symptoms do not improve or if they get worse  · Keep all medicine out of the reach of children  Never share your medicine with anyone  Possible Side Effects While Using This Medicine:   Call your doctor right away if you notice any of these side effects:  · Allergic reaction: Itching or hives, swelling in your face or hands, swelling or tingling in your mouth or throat, chest tightness, trouble breathing  · Blistering, peeling, red skin rash  · Severe diarrhea, especially if bloody or ongoing  · Severe stomach pain, vomiting  If you notice these less serious side effects, talk with your doctor:   · Mild diarrhea or nausea  If you notice other side effects that you think are caused by this medicine, tell your doctor  Call your doctor for medical advice about side effects  You may report side effects to FDA at 4-006-FDA-1862  © 2017 [address] [employer] Information is for End User's use only and may not be sold, redistributed or otherwise used for commercial purposes  The above information is an  only  It is not intended as medical advice for individual conditions or treatments  Talk to your doctor, nurse or pharmacist before following any medical regimen to see if it is safe and effective for you

## 2019-07-17 LAB — BACTERIA UR CULT: NORMAL

## 2019-07-18 ENCOUNTER — OFFICE VISIT (OUTPATIENT)
Dept: FAMILY MEDICINE CLINIC | Facility: CLINIC | Age: 54
End: 2019-07-18
Payer: COMMERCIAL

## 2019-07-18 VITALS
SYSTOLIC BLOOD PRESSURE: 132 MMHG | TEMPERATURE: 98.3 F | OXYGEN SATURATION: 98 % | BODY MASS INDEX: 37.69 KG/M2 | HEIGHT: 62 IN | HEART RATE: 97 BPM | DIASTOLIC BLOOD PRESSURE: 80 MMHG | WEIGHT: 204.8 LBS

## 2019-07-18 DIAGNOSIS — E03.9 HYPOTHYROIDISM, UNSPECIFIED TYPE: ICD-10-CM

## 2019-07-18 DIAGNOSIS — R10.2 PELVIC PAIN: Primary | ICD-10-CM

## 2019-07-18 PROCEDURE — 99213 OFFICE O/P EST LOW 20 MIN: CPT | Performed by: FAMILY MEDICINE

## 2019-07-18 PROCEDURE — 3008F BODY MASS INDEX DOCD: CPT | Performed by: FAMILY MEDICINE

## 2019-07-18 RX ORDER — LEVOTHYROXINE SODIUM 0.05 MG/1
50 TABLET ORAL DAILY
Qty: 90 TABLET | Refills: 1 | Status: SHIPPED | OUTPATIENT
Start: 2019-07-18 | End: 2020-03-16 | Stop reason: SDUPTHER

## 2019-07-18 NOTE — PROGRESS NOTES
Assessment/Plan:  Patient is seen for follow-up to a urinary tract infection in Urgent Care  The culture was actually negative  D/C Keflex  I have ordered an ultrasound of the pelvis  Diagnoses and all orders for this visit:    Pelvic pain  -     US pelvis complete w transvaginal; Future    Hypothyroidism, unspecified type  -     levothyroxine 50 mcg tablet; Take 1 tablet (50 mcg total) by mouth daily          Subjective:   Chief Complaint   Patient presents with    Back Pain     Pt c/o lower back pain and cramps for about 2 weeks  Patient ID: Angella Verma is a 47 y o  female  Patient with low back pain and pelvic pain  The following portions of the patient's history were reviewed and updated as appropriate: allergies, current medications, past family history, past medical history, past social history, past surgical history and problem list     Review of Systems   Constitutional: Negative for chills and fever  HENT: Negative for congestion and sore throat  Respiratory: Negative for chest tightness  Cardiovascular: Negative for chest pain and palpitations  Gastrointestinal: Negative for abdominal pain, constipation, diarrhea and nausea  Genitourinary: Positive for pelvic pain  Negative for difficulty urinating  Musculoskeletal: Positive for back pain  Skin: Negative  Neurological: Negative for dizziness and headaches  Psychiatric/Behavioral: Negative  Objective:      /80 (BP Location: Left arm, Patient Position: Sitting, Cuff Size: Adult)   Pulse 97   Temp 98 3 °F (36 8 °C) (Tympanic)   Ht 5' 2 32" (1 583 m)   Wt 92 9 kg (204 lb 12 8 oz)   LMP 03/13/2018   SpO2 98%   BMI 37 07 kg/m²          Physical Exam   Constitutional: She is oriented to person, place, and time  She appears well-developed  No distress  Neck: Carotid bruit is not present  No thyromegaly present  Cardiovascular: Normal rate, regular rhythm and normal heart sounds  Pulmonary/Chest: Effort normal and breath sounds normal    Abdominal: There is tenderness  Musculoskeletal: She exhibits no edema  Lymphadenopathy:     She has no cervical adenopathy  Neurological: She is alert and oriented to person, place, and time  Skin: Skin is warm and dry  Psychiatric: She has a normal mood and affect  Nursing note and vitals reviewed

## 2019-07-19 ENCOUNTER — HOSPITAL ENCOUNTER (OUTPATIENT)
Dept: RADIOLOGY | Age: 54
Discharge: HOME | End: 2019-07-19
Payer: COMMERCIAL

## 2019-07-19 DIAGNOSIS — R10.2 PELVIC PAIN: ICD-10-CM

## 2019-07-19 PROCEDURE — 76856 US EXAM PELVIC COMPLETE: CPT

## 2019-07-19 PROCEDURE — 76830 TRANSVAGINAL US NON-OB: CPT

## 2019-07-23 ENCOUNTER — TELEPHONE (OUTPATIENT)
Dept: FAMILY MEDICINE CLINIC | Facility: CLINIC | Age: 54
End: 2019-07-23

## 2019-07-23 NOTE — TELEPHONE ENCOUNTER
I called patient  Her ultrasound showed a thickened endometrium-14 millimeter thickness  Since she is postmenopausal, she needs to have an endometrial biopsy  She should call Dr Renetta Boogie office a call to schedule an appointment

## 2019-07-23 NOTE — TELEPHONE ENCOUNTER
Cheri from [address] [place] [address] [facility] called to let us know about significant findings on US pelvis  Please review

## 2019-07-24 ENCOUNTER — APPOINTMENT (OUTPATIENT)
Dept: RADIOLOGY | Facility: CLINIC | Age: 54
End: 2019-07-24
Payer: COMMERCIAL

## 2019-07-24 DIAGNOSIS — G89.29 CHRONIC MIDLINE LOW BACK PAIN WITHOUT SCIATICA: Primary | ICD-10-CM

## 2019-07-24 DIAGNOSIS — G89.29 CHRONIC MIDLINE LOW BACK PAIN WITHOUT SCIATICA: ICD-10-CM

## 2019-07-24 DIAGNOSIS — M54.50 CHRONIC MIDLINE LOW BACK PAIN WITHOUT SCIATICA: Primary | ICD-10-CM

## 2019-07-24 DIAGNOSIS — M54.50 CHRONIC MIDLINE LOW BACK PAIN WITHOUT SCIATICA: ICD-10-CM

## 2019-07-24 DIAGNOSIS — F41.9 ANXIETY: ICD-10-CM

## 2019-07-24 PROCEDURE — 72110 X-RAY EXAM L-2 SPINE 4/>VWS: CPT

## 2019-07-24 RX ORDER — ALPRAZOLAM 0.25 MG/1
0.25 TABLET ORAL 2 TIMES DAILY PRN
Qty: 15 TABLET | Refills: 0 | Status: SHIPPED | OUTPATIENT
Start: 2019-07-24 | End: 2019-08-08 | Stop reason: SDUPTHER

## 2019-07-26 ENCOUNTER — APPOINTMENT (OUTPATIENT)
Dept: LAB | Facility: CLINIC | Age: 54
End: 2019-07-26
Payer: COMMERCIAL

## 2019-07-26 DIAGNOSIS — Z12.12 SCREENING FOR COLORECTAL CANCER: ICD-10-CM

## 2019-07-26 DIAGNOSIS — Z12.11 SCREENING FOR COLORECTAL CANCER: ICD-10-CM

## 2019-07-26 LAB — HEMOCCULT STL QL IA: NEGATIVE

## 2019-07-26 PROCEDURE — G0328 FECAL BLOOD SCRN IMMUNOASSAY: HCPCS

## 2019-07-29 ENCOUNTER — TELEPHONE (OUTPATIENT)
Dept: FAMILY MEDICINE CLINIC | Facility: CLINIC | Age: 54
End: 2019-07-29

## 2019-07-31 DIAGNOSIS — R10.9 ABDOMINAL SPASMS: Primary | ICD-10-CM

## 2019-07-31 RX ORDER — DICYCLOMINE HYDROCHLORIDE 10 MG/1
10 CAPSULE ORAL
Qty: 30 CAPSULE | Refills: 0 | Status: SHIPPED | OUTPATIENT
Start: 2019-07-31 | End: 2019-09-26 | Stop reason: ALTCHOICE

## 2019-07-31 NOTE — TELEPHONE ENCOUNTER
I spoke to patient regarding her x-ray results  It does show some mild degenerative changes  And is okay for her to take ibuprofen over-the-counter  Also she is still having some abdominal cramping and I cannot definitely say this is because of vaginal spotting and thickened endometrium  We will try dicyclomine since she has been having some loose stools and on her lumbar spine x-ray it did appear she had a lot of gas  If the symptoms do not go way, she will make an appointment with Gastroenterology  Her fit test was negative

## 2019-08-07 ENCOUNTER — PROCEDURE VISIT (OUTPATIENT)
Dept: OBGYN CLINIC | Facility: MEDICAL CENTER | Age: 54
End: 2019-08-07
Payer: COMMERCIAL

## 2019-08-07 VITALS — SYSTOLIC BLOOD PRESSURE: 122 MMHG | BODY MASS INDEX: 34.92 KG/M2 | WEIGHT: 192.9 LBS | DIASTOLIC BLOOD PRESSURE: 70 MMHG

## 2019-08-07 DIAGNOSIS — R93.89 THICKENED ENDOMETRIUM: Primary | ICD-10-CM

## 2019-08-07 DIAGNOSIS — R10.2 PELVIC PAIN: ICD-10-CM

## 2019-08-07 PROCEDURE — 88305 TISSUE EXAM BY PATHOLOGIST: CPT | Performed by: PATHOLOGY

## 2019-08-07 PROCEDURE — 99214 OFFICE O/P EST MOD 30 MIN: CPT | Performed by: OBSTETRICS & GYNECOLOGY

## 2019-08-07 PROCEDURE — 58100 BIOPSY OF UTERUS LINING: CPT | Performed by: OBSTETRICS & GYNECOLOGY

## 2019-08-07 NOTE — PROGRESS NOTES
Endometrial Biopsy Procedure Note    Pre-operative Diagnosis: Thickened endometrium    Post-operative Diagnosis: same    Indications: Thickened EMS    Procedure Details   Urine pregnancy test was not done  The risks (including infection, bleeding, pain, and uterine perforation) and benefits of the procedure were explained to the patient and Written informed consent was obtained  The patient was placed in the dorsal lithotomy position  Bimanual exam showed the uterus to be in the anteroflexed position  A Graves' speculum inserted in the vagina, and the cervix prepped with povidone iodine  Endocervical curettage with a Jesseian curette was not performed  A sharp tenaculum was applied to the anterior lip of the cervix for stabilization  A sterile uterine sound was used to sound the uterus to a depth of 7 cm  A Pipelle endometrial aspirator was used to sample the endometrium  Sample was sent for pathologic examination  Condition:  Stable    Complications:  None    Plan:    The patient was advised to call for any fever or for prolonged or severe pain or bleeding  She was advised to use OTC analgesics as needed for mild to moderate pain  She was advised to avoid vaginal intercourse for 48 hours or until the bleeding has completely stopped

## 2019-08-07 NOTE — PROGRESS NOTES
Leonid Schulte was seen today for procedure  Diagnoses and all orders for this visit:    Thickened endometrium    Pelvic pain         Plan  EMB done today  Trevin Shaw is a 47 y o  female here for a problem visit  Patient is complaining of increased pelvic cramping and back pain for the past 1 month  LNMP November 2017, but went on to have spotting every 2-3 months until July 2018  Was seen by her PCP who ordered a pelvic ultrasound  Ultrasound showed a thickened endometrium of 14 mm and a simple right ovarian cyst   Patient also has a history of diverticulosis  In 2013, patient had an endometrial biopsy for abnormal uterine bleeding  Results showed possible polyp, cannot rule out simple hyperplasia 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  She underwent a D&C, which confirmed the diagnosis of simple hyperplasia 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  Patient states she was never treated with progesterone and never resampled  Patient Active Problem List   Diagnosis    Perimenopause    Elevated blood sugar    Hyperlipidemia    Hypothyroidism       Gynecologic History  Patient's last menstrual period was 03/13/2018  Past Medical History:   Diagnosis Date    Allergic rhinitis     Last Assessed:9/13/2012    Myxedema     Last Assessed:1/8/2013     No past surgical history on file    Family History   Problem Relation Age of Onset    Lupus Mother     Heart defect Mother     Kidney disease Mother     Lung cancer Father      Social History     Socioeconomic History    Marital status: /Civil Union     Spouse name: Not on file    Number of children: Not on file    Years of education: Not on file    Highest education level: Not on file   Occupational History    Not on file   Social Needs    Financial resource strain: Not on file    Food insecurity:     Worry: Not on file     Inability: Not on file    Transportation needs:     Medical: Not on file     Non-medical: Not on file   Tobacco Use    Smoking status: Never Smoker    Smokeless tobacco: Never Used   Substance and Sexual Activity    Alcohol use: Yes     Frequency: Monthly or less     Drinks per session: 1 or 2     Binge frequency: Never     Comment: occasional    Drug use: No    Sexual activity: Yes     Partners: Male     Birth control/protection: Male Sterilization   Lifestyle    Physical activity:     Days per week: Not on file     Minutes per session: Not on file    Stress: Not on file   Relationships    Social connections:     Talks on phone: Not on file     Gets together: Not on file     Attends Gnosticism service: Not on file     Active member of club or organization: Not on file     Attends meetings of clubs or organizations: Not on file     Relationship status: Not on file    Intimate partner violence:     Fear of current or ex partner: Not on file     Emotionally abused: Not on file     Physically abused: Not on file     Forced sexual activity: Not on file   Other Topics Concern    Not on file   Social History Narrative    Not on file     Allergies   Allergen Reactions    Codeine GI Intolerance    Tramadol GI Intolerance       Current Outpatient Medications:     ALPRAZolam (XANAX) 0 25 mg tablet, Take 1 tablet (0 25 mg total) by mouth 2 (two) times a day as needed for anxiety, Disp: 15 tablet, Rfl: 0    calcium citrate-vitamin D (CITRACAL+D) 315-200 MG-UNIT per tablet, Take 1 tablet by mouth 2 (two) times a day, Disp: , Rfl:     dicyclomine (BENTYL) 10 mg capsule, Take 1 capsule (10 mg total) by mouth 3 (three) times a day before meals, Disp: 30 capsule, Rfl: 0    levothyroxine 50 mcg tablet, Take 1 tablet (50 mcg total) by mouth daily, Disp: 90 tablet, Rfl: 1    Multiple Vitamin (MULTIVITAMIN) capsule, Take 1 capsule by mouth daily, Disp: , Rfl:     Omega-3 Fatty Acids (FISH OIL) 1,000 mg, Take 1,000 mg by mouth daily, Disp: , Rfl:     Review of Systems  Constitutional :no fever, feels well, no tiredness, no recent weight gain or loss  ENT: no ear ache, no loss of hearing, no nosebleeds or nasal discharge, no sore throat or hoarseness  Cardiovascular: no complaints of slow or fast heart beat, no chest pain, no palpitations, no leg claudication or lower extremity edema  Respiratory: no complaints of shortness of shortness of breath, no MORENO  Breasts:no complaints of breast pain, breast lump, or nipple discharge  Gastrointestinal: no complaints of abdominal pain, constipation, nausea, vomiting, or diarrhea or bloody stools  Genitourinary : no complaints of dysuria, incontinence, pelvic pain, no dysmenorrhea, vaginal discharge or abnormal vaginal bleeding and as noted in HPI  Musculoskeletal: no complaints of arthralgia, no myalgia, no joint swelling or stiffness, no limb pain or swelling  Integumentary: no complaints of skin rash or lesion, itching or dry skin  Neurological: no complaints of headache, no confusion, no numbness or tingling, no dizziness or fainting     Objective     /70   Wt 87 5 kg (192 lb 14 4 oz)   LMP 03/13/2018   BMI 34 92 kg/m²     General:   appears stated age, cooperative, alert normal mood and affect   Neck: normal, supple,trachea midline, no masses   Heart: regular rate and rhythm, S1, S2 normal, no murmur, click, rub or gallop   Lungs: clear to auscultation bilaterally   Abdomen: soft, non-tender, without masses or organomegaly   Vulva: normal female genitalia, Bartholin's, Urethra, [place] normal, no lesions, normal female hair distribution   Vagina: normal vagina, no discharge, exudate, lesion, or erythema   Urethra: normal   Cervix: Normal, no discharge  Uterus: normal size, contour, position, consistency, mobility, non-tender   Adnexa: no mass, fullness, tenderness   Lymphatic palpation of lymph nodes in neck, axilla, groin and/or other locations: no lymphadenopathy or masses noted   Skin normal skin turgor and no rashes     Psychiatric orientation to person, place, and time: normal  mood and affect: normal

## 2019-08-07 NOTE — PATIENT INSTRUCTIONS
Thank you for your confidence in our team    We appreciate you and welcome your feedback  If you receive a survey from us, please take a few moments to let us know how we are doing     Sincerely,   Mike Thorne MD

## 2019-08-08 ENCOUNTER — TELEPHONE (OUTPATIENT)
Dept: FAMILY MEDICINE CLINIC | Facility: CLINIC | Age: 54
End: 2019-08-08

## 2019-08-08 DIAGNOSIS — F41.9 ANXIETY: ICD-10-CM

## 2019-08-08 RX ORDER — ALPRAZOLAM 0.25 MG/1
0.25 TABLET ORAL 2 TIMES DAILY PRN
Qty: 30 TABLET | Refills: 0 | Status: SHIPPED | OUTPATIENT
Start: 2019-08-08 | End: 2019-09-26 | Stop reason: ALTCHOICE

## 2019-08-08 NOTE — TELEPHONE ENCOUNTER
Pt had her biopsy done yesterday and it will take 10-14 days for results  She has 3 xanax left and is asking for another rx to go to [facility] [place]

## 2019-08-15 ENCOUNTER — TELEPHONE (OUTPATIENT)
Dept: FAMILY MEDICINE CLINIC | Facility: CLINIC | Age: 54
End: 2019-08-15

## 2019-08-15 NOTE — TELEPHONE ENCOUNTER
Pt called regarding lower abdominal cramping, pt saw a GYN but pt does not believe it is GYN related  Pt is  requesting a call back

## 2019-08-16 DIAGNOSIS — M54.50 ACUTE BILATERAL LOW BACK PAIN WITHOUT SCIATICA: Primary | ICD-10-CM

## 2019-08-16 RX ORDER — CYCLOBENZAPRINE HCL 5 MG
5 TABLET ORAL 3 TIMES DAILY PRN
Qty: 30 TABLET | Refills: 0 | Status: SHIPPED | OUTPATIENT
Start: 2019-08-16 | End: 2019-09-26 | Stop reason: SDUPTHER

## 2019-08-16 NOTE — TELEPHONE ENCOUNTER
I spoke to patient and sent a prescription for Flexeril to the pharmacy for her back spasms    I also recommended that she take the alprazolam as needed if she does feel anxious until she follows up with Dr Lorenza Rachel regarding her hormone imbalance 

## 2019-08-28 ENCOUNTER — OFFICE VISIT (OUTPATIENT)
Dept: OBGYN CLINIC | Facility: CLINIC | Age: 54
End: 2019-08-28
Payer: COMMERCIAL

## 2019-08-28 VITALS — WEIGHT: 190.4 LBS | SYSTOLIC BLOOD PRESSURE: 120 MMHG | DIASTOLIC BLOOD PRESSURE: 70 MMHG | BODY MASS INDEX: 34.46 KG/M2

## 2019-08-28 DIAGNOSIS — R93.89 THICKENED ENDOMETRIUM: Primary | ICD-10-CM

## 2019-08-28 PROCEDURE — 99213 OFFICE O/P EST LOW 20 MIN: CPT | Performed by: OBSTETRICS & GYNECOLOGY

## 2019-08-29 NOTE — PROGRESS NOTES
Assessment Diagnoses and all orders for this visit:    Thickened endometrium       Plan  Patient reassured with EMB results of no hyperplasia or malignancy  Discussed options of continued surveillance or D&C hysteroscopy   Will consider options and call with decision    Subjective   Spike Ch is a 47 y o  female here for a follow up visit from her recent EMB  Patient's LNMP in November 2017 but continued to have irregular spotting until July 2018  Had no cycle since then but was seen by PCP for cramps and LBP  An ultrasound was ordered and found to have an EMS of 14 mm  Patient denies pain or bleeding since her last visit  Admits to being anxious about reviewing her results  Of note, patient has a history of endometrial hyperplasia 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 in 2013 with no follow up or treatment  Patient Active Problem List   Diagnosis    Perimenopause    Elevated blood sugar    Hyperlipidemia    Hypothyroidism       Gynecologic History  Patient's last menstrual period was 03/13/2018  Past Medical History:   Diagnosis Date    Allergic rhinitis     Last Assessed:9/13/2012    Myxedema     Last Assessed:1/8/2013     No past surgical history on file    Family History   Problem Relation Age of Onset    Lupus Mother     Heart defect Mother     Kidney disease Mother     Lung cancer Father      Social History     Socioeconomic History    Marital status: /Civil Union     Spouse name: Not on file    Number of children: Not on file    Years of education: Not on file    Highest education level: Not on file   Occupational History    Not on file   Social Needs    Financial resource strain: Not on file    Food insecurity:     Worry: Not on file     Inability: Not on file    Transportation needs:     Medical: Not on file     Non-medical: Not on file   Tobacco Use    Smoking status: Never Smoker    Smokeless tobacco: Never Used   Substance and Sexual Activity    Alcohol use: Yes     Frequency: Monthly or less     Drinks per session: 1 or 2     Binge frequency: Never     Comment: occasional    Drug use: No    Sexual activity: Yes     Partners: Male     Birth control/protection: Male Sterilization   Lifestyle    Physical activity:     Days per week: Not on file     Minutes per session: Not on file    Stress: Not on file   Relationships    Social connections:     Talks on phone: Not on file     Gets together: Not on file     Attends Nondenominational service: Not on file     Active member of club or organization: Not on file     Attends meetings of clubs or organizations: Not on file     Relationship status: Not on file    Intimate partner violence:     Fear of current or ex partner: Not on file     Emotionally abused: Not on file     Physically abused: Not on file     Forced sexual activity: Not on file   Other Topics Concern    Not on file   Social History Narrative    Not on file     Allergies   Allergen Reactions    Codeine GI Intolerance    Tramadol GI Intolerance       Current Outpatient Medications:     calcium citrate-vitamin D (CITRACAL+D) 315-200 MG-UNIT per tablet, Take 1 tablet by mouth 2 (two) times a day, Disp: , Rfl:     cyclobenzaprine (FLEXERIL) 5 mg tablet, Take 1 tablet (5 mg total) by mouth 3 (three) times a day as needed for muscle spasms, Disp: 30 tablet, Rfl: 0    levothyroxine 50 mcg tablet, Take 1 tablet (50 mcg total) by mouth daily, Disp: 90 tablet, Rfl: 1    Multiple Vitamin (MULTIVITAMIN) capsule, Take 1 capsule by mouth daily, Disp: , Rfl:     Omega-3 Fatty Acids (FISH OIL) 1,000 mg, Take 1,000 mg by mouth daily, Disp: , Rfl:     ALPRAZolam (XANAX) 0 25 mg tablet, Take 1 tablet (0 25 mg total) by mouth 2 (two) times a day as needed for anxiety (Patient not taking: Reported on 8/28/2019), Disp: 30 tablet, Rfl: 0    dicyclomine (BENTYL) 10 mg capsule, Take 1 capsule (10 mg total) by mouth 3 (three) times a day before meals (Patient not taking: Reported on 8/28/2019), Disp: 30 capsule, Rfl: 0    Review of Systems  Constitutional :no fever, feels well, no tiredness, no recent weight gain or loss  ENT: no ear ache, no loss of hearing, no nosebleeds or nasal discharge, no sore throat or hoarseness  Cardiovascular: no complaints of slow or fast heart beat, no chest pain, no palpitations, no leg claudication or lower extremity edema  Respiratory: no complaints of shortness of shortness of breath, no MORENO  Breasts:no complaints of breast pain, breast lump, or nipple discharge  Gastrointestinal: no complaints of abdominal pain, constipation, nausea, vomiting, or diarrhea or bloody stools  Genitourinary : no complaints of dysuria, incontinence, pelvic pain, no dysmenorrhea, vaginal discharge or abnormal vaginal bleeding and as noted in HPI  Musculoskeletal: no complaints of arthralgia, no myalgia, no joint swelling or stiffness, no limb pain or swelling  Integumentary: no complaints of skin rash or lesion, itching or dry skin  Neurological: no complaints of headache, no confusion, no numbness or tingling, no dizziness or fainting     Objective     /70   Wt 86 4 kg (190 lb 6 4 oz)   LMP 03/13/2018   BMI 34 46 kg/m²     General:   appears stated age, cooperative, alert normal mood and affect     Final Diagnosis   A   Endometrium (biopsy):  - Disordered proliferative endometrium  - Scant benign squamous mucosa   - No carcinoma identified

## 2019-09-26 ENCOUNTER — OFFICE VISIT (OUTPATIENT)
Dept: FAMILY MEDICINE CLINIC | Facility: CLINIC | Age: 54
End: 2019-09-26
Payer: COMMERCIAL

## 2019-09-26 ENCOUNTER — APPOINTMENT (OUTPATIENT)
Dept: RADIOLOGY | Facility: CLINIC | Age: 54
End: 2019-09-26
Payer: COMMERCIAL

## 2019-09-26 VITALS
DIASTOLIC BLOOD PRESSURE: 76 MMHG | OXYGEN SATURATION: 98 % | WEIGHT: 185.2 LBS | HEIGHT: 63 IN | SYSTOLIC BLOOD PRESSURE: 124 MMHG | RESPIRATION RATE: 16 BRPM | TEMPERATURE: 98.4 F | BODY MASS INDEX: 32.82 KG/M2 | HEART RATE: 91 BPM

## 2019-09-26 DIAGNOSIS — M25.551 RIGHT HIP PAIN: ICD-10-CM

## 2019-09-26 DIAGNOSIS — G89.29 CHRONIC BILATERAL LOW BACK PAIN WITH BILATERAL SCIATICA: ICD-10-CM

## 2019-09-26 DIAGNOSIS — M25.552 LEFT HIP PAIN: ICD-10-CM

## 2019-09-26 DIAGNOSIS — M54.50 ACUTE BILATERAL LOW BACK PAIN WITHOUT SCIATICA: Primary | ICD-10-CM

## 2019-09-26 DIAGNOSIS — M54.42 CHRONIC BILATERAL LOW BACK PAIN WITH BILATERAL SCIATICA: ICD-10-CM

## 2019-09-26 DIAGNOSIS — M54.41 CHRONIC BILATERAL LOW BACK PAIN WITH BILATERAL SCIATICA: ICD-10-CM

## 2019-09-26 PROCEDURE — 73521 X-RAY EXAM HIPS BI 2 VIEWS: CPT

## 2019-09-26 PROCEDURE — 3008F BODY MASS INDEX DOCD: CPT | Performed by: FAMILY MEDICINE

## 2019-09-26 PROCEDURE — 99213 OFFICE O/P EST LOW 20 MIN: CPT | Performed by: FAMILY MEDICINE

## 2019-09-26 RX ORDER — CYCLOBENZAPRINE HCL 5 MG
5 TABLET ORAL 3 TIMES DAILY PRN
Qty: 90 TABLET | Refills: 0 | Status: SHIPPED | OUTPATIENT
Start: 2019-09-26 | End: 2020-06-25 | Stop reason: SDUPTHER

## 2019-09-26 NOTE — PROGRESS NOTES
Assessment/Plan:    No problem-specific Assessment & Plan notes found for this encounter  Diagnoses and all orders for this visit:    Acute bilateral low back pain without sciatica  -     cyclobenzaprine (FLEXERIL) 5 mg tablet; Take 1 tablet (5 mg total) by mouth 3 (three) times a day as needed for muscle spasms    Right hip pain  -     Cancel: XR hip/pelv 2-3 vws right if performed; Future    Left hip pain  -     XR hip/pelv 2-3 vws left if performed; Future    Chronic bilateral low back pain with bilateral sciatica  -     Ambulatory referral to Physical Therapy; Future    Other orders  -     norethindrone (AYGESTIN) 5 mg tablet; Take 5 mg by mouth 2 (two) times a day          Subjective:   Chief Complaint   Patient presents with    Follow-up     review seeing specialist      Patient ID: Shital Franklin is a 47 y o  female  Patient is seen to discuss the abdominal discomfort she has been having  She was seen by gyn and had an endometrial biopsy but it did not show hyperplasia  She has had a CT scan and she does not have a kidney stone  She just had her colonoscopy and that was negative also  She is wondering if her symptoms are related to ana maria menopause  She did see another gynecologist at [address] [place] [address] for a 2nd opinion and she prescribed progesterone which Dr Rogerio Cannon had also suggested  The following portions of the patient's history were reviewed and updated as appropriate: allergies, current medications, past family history, past medical history, past social history, past surgical history and problem list     Review of Systems   Constitutional: Negative for chills and fever  HENT: Negative for congestion and sore throat  Respiratory: Negative for chest tightness  Cardiovascular: Negative for chest pain and palpitations  Gastrointestinal: Positive for abdominal pain  Negative for constipation, diarrhea and nausea  Genitourinary: Negative for difficulty urinating  Skin: Negative  Neurological: Negative for dizziness and headaches  Psychiatric/Behavioral: Negative  Objective:      /76 (BP Location: Left arm, Patient Position: Sitting)   Pulse 91   Temp 98 4 °F (36 9 °C) (Tympanic)   Resp 16   Ht 5' 3" (1 6 m)   Wt 84 kg (185 lb 3 2 oz)   LMP 03/13/2018   SpO2 98%   BMI 32 81 kg/m²          Physical Exam   Psychiatric: She has a normal mood and affect  Nursing note and vitals reviewed  I have spent 30 minutes with Patient  today in which greater than 50% of this time was spent in counseling/coordination of care regarding Diagnostic results and Risks and benefits of tx options  We discussed that she would see how she would do with Progesterone  Also it is possible that the discomfort could be related to her back pain also  I did give her orders for physical therapy and she will have hip xrays also

## 2019-10-01 ENCOUNTER — EVALUATION (OUTPATIENT)
Dept: PHYSICAL THERAPY | Facility: CLINIC | Age: 54
End: 2019-10-01
Payer: COMMERCIAL

## 2019-10-01 DIAGNOSIS — M54.42 CHRONIC BILATERAL LOW BACK PAIN WITH BILATERAL SCIATICA: Primary | ICD-10-CM

## 2019-10-01 DIAGNOSIS — M54.41 CHRONIC BILATERAL LOW BACK PAIN WITH BILATERAL SCIATICA: Primary | ICD-10-CM

## 2019-10-01 DIAGNOSIS — G89.29 CHRONIC BILATERAL LOW BACK PAIN WITH BILATERAL SCIATICA: Primary | ICD-10-CM

## 2019-10-01 PROCEDURE — 97162 PT EVAL MOD COMPLEX 30 MIN: CPT | Performed by: PHYSICAL THERAPIST

## 2019-10-01 NOTE — PROGRESS NOTES
PT Evaluation     Today's date: 10/1/2019  Patient name: Saulo Murray  : 1965  MRN: [id number]  Referring provider: Johnie Nielsen DO  Dx:   Encounter Diagnosis     ICD-10-CM    1  Chronic bilateral low back pain with bilateral sciatica M54 42 Ambulatory referral to Physical Therapy    M54 41     G89 29                   Assessment  Assessment details: Saulo Murray is a 47 y o  female presenting to physical therapy with lumbar pain, decreased range of motion, decreased strength, gait/balance dysfunction and decreased activity tolerance  Assessment demonstrates sciatic nerve tension with reproduction of symptoms with piriformis release/compression and lumbar rotation/extension to the right  Secondary to these impairments, patient has increased difficulty performing ADL's, household chores and  work related tasks  Zaid Pu would benefit from skilled PT to address these issues and maximize function    Thank you for the referral     Impairments: abnormal muscle tone, abnormal or restricted ROM, abnormal movement, activity intolerance, impaired physical strength and pain with function  Understanding of Dx/Px/POC: excellent  Goals  Short Term Goals: to be achieved by 4 weeks  1) Patient to be independent with initial HEP  2) Decrease pain to < or equal to 5/10 at its worst  3) Increase lumbar spine ROM by 10% in all deficient planes  4) Increase LE strength by 1/2 MMT grade in all deficient planes  5) Patient to report decreased sleep interruption secondary to pain    Long Term Goals: to be achieved by discharge  1) Increase FOTO score > or equal to expected outcome  2) Patient to be independent with comprehensive HEP  3) Abolish pain for improved quality of life  4) Lumbar spine AROM WNL all planes to improve a/iadls  5) Increase LE strength to 5/5 MMT grade in all planes to improve a/iadls  6) Patient to report no sleep interruption secondary to pain    Plan  Patient would benefit from: skilled PT  Planned modality interventions: TENS and thermotherapy: hydrocollator packs  Planned therapy interventions: joint mobilization, manual therapy, neuromuscular re-education, patient education, strengthening, stretching, therapeutic exercise, home exercise program, ADL training and abdominal trunk stabilization  Frequency: 2x week  Duration in weeks: 8  Treatment plan discussed with: patient        Subjective Evaluation    History of Present Illness  Mechanism of injury: Patient reports to outpatient PT regarding ongoing abdominal and lumbar pain for about 2 5 months of insidious onset  To date patient reports being seen by OBGYN and her PCP without significant finding  Patient also notes having a negative CT scan and colonoscopy  Secondary to exhaustive efforts thus far, patient was referred to PT for potential implications of lumbar pain  Patient states that she has a history of chronic lumbar pain and notes DDD  Patient works for [employer] doing  work which entails primarily sitting  Patient states that her history of pain was just an "annoyance" with waxing/waning symptoms but since July she states that the pain is different  Patient does note some radicular symptoms into her right buttock region  Patient reports worsening of symptoms with prolonged positioning and relief with heat/ice  Patients goals for PT are to reduce her pain and improve current level function               Recurrent probem    Quality of life: good    Pain  Current pain ratin  At best pain ratin  At worst pain ratin  Quality: dull ache and radiating  Relieving factors: ice and heat  Aggravating factors: standing, sitting and walking  Progression: worsening    Social Support  Steps to enter house: yes  Stairs in house: yes   Lives in: multiple-level home  Lives with: spouse    Employment status: working    Diagnostic Tests  X-ray: normal  CT scan: normal  Patient Goals  Patient goals for therapy: increased strength, independence with ADLs/IADLs, return to sport/leisure activities, decreased pain, increased motion and return to work          Objective     Concurrent Complaints  Positive for night pain and disturbed sleep  Negative for bladder dysfunction, bowel dysfunction and saddle (S4) numbness    Palpation   Left   Hypertonic in the erector spinae  Tenderness of the erector spinae  Right   No palpable tenderness to the erector spinae       Additional Palpation Details  (+) Piriformis hypertonicity and tenderness on the right    Neurological Testing     Sensation     Lumbar   Left   Intact: light touch    Right   Intact: light touch    Reflexes   Left   Patellar (L4): normal (2+)  Achilles (S1): normal (2+)  Clonus sign: negative    Right   Patellar (L4): normal (2+)  Achilles (S1): normal (2+)  Clonus sign: negative    Active Range of Motion     Lumbar   Flexion:  WFL  Extension:  with pain Restriction level: minimal  Left lateral flexion:  Restriction level: minimal  Right lateral flexion:  with pain Restriction level: moderate  Left rotation:  WFL  Right rotation:  [facility]    Joint Play   Joints within functional limits: L2, L3 and L4     Hypomobile: T12, L1, L5 and S1   Mechanical Assessment    Cervical      Thoracic      Lumbar    Standing extension: repeated movements  Pain location: no change  Pain intensity: worse  Left Sidegliding: repeated movements  Pain location: no change  Right sidegliding: repeated movements  Pain location: no change    Strength/Myotome Testing     Lumbar   Left   Heel walk: normal  Toe walk: normal    Right   Heel walk: normal  Toe walk: normal    Left Hip   Planes of Motion   Flexion: 4  Abduction: 3-  External rotation: 4-  Internal rotation: 4-    Right Hip   Planes of Motion   Flexion: 4  Abduction: 3-  External rotation: 4-  Internal rotation: 4-    Left Knee   Flexion: 4+  Extension: 4+    Right Knee   Flexion: 4+  Extension: 4+    Left Ankle/Foot   Dorsiflexion: 4+  Plantar flexion: 4  Great toe extension: 4    Right Ankle/Foot   Dorsiflexion: 4+  Plantar flexion: 4  Great toe extension: 4    Muscle Activation   Patient able to activate left transverse abdominals, left multifidus, right transverse abdominals and right multifidus  Additional Muscle Activation Details  MMT:  TA = 4-/5  Multifidus = 4-/5 on left, 4/5 on right    Tests     Lumbar   Negative prone instability , SIJ compression and sacroiliac distraction  Left   Negative crossed SLR, passive SLR and slump test      Right   Positive passive SLR, quadrant and slump test    Negative crossed SLR  Right Pelvic Girdle/Sacrum   Negative: thigh thrust      Right Hip   Positive JENNIFER and FADIR         Flowsheet Rows      Most Recent Value   PT/OT G-Codes   Current Score  61   Projected Score  74             Precautions: Myxedema      Manual  10/1            L sidelying for R lumbopelvic JM Grade IV, Grade V            R piriformis release             R piriformis stretch                                           Exercise Diary  10/1            TM warm up             Piriformis stretch 4x30" on R            Piriformis release w/ lax ball 4x30" on R            Slump nerve glides on R 2x10            Standing hip abduction w/ TB             Clamshell with varying hip angle                                                                                                                                                                                                       Modalities

## 2019-10-07 ENCOUNTER — OFFICE VISIT (OUTPATIENT)
Dept: PHYSICAL THERAPY | Facility: CLINIC | Age: 54
End: 2019-10-07
Payer: COMMERCIAL

## 2019-10-07 DIAGNOSIS — M54.41 CHRONIC BILATERAL LOW BACK PAIN WITH BILATERAL SCIATICA: Primary | ICD-10-CM

## 2019-10-07 DIAGNOSIS — M54.42 CHRONIC BILATERAL LOW BACK PAIN WITH BILATERAL SCIATICA: Primary | ICD-10-CM

## 2019-10-07 DIAGNOSIS — G89.29 CHRONIC BILATERAL LOW BACK PAIN WITH BILATERAL SCIATICA: Primary | ICD-10-CM

## 2019-10-07 PROCEDURE — 97110 THERAPEUTIC EXERCISES: CPT | Performed by: PHYSICAL THERAPIST

## 2019-10-07 PROCEDURE — 97140 MANUAL THERAPY 1/> REGIONS: CPT | Performed by: PHYSICAL THERAPIST

## 2019-10-07 PROCEDURE — 97112 NEUROMUSCULAR REEDUCATION: CPT | Performed by: PHYSICAL THERAPIST

## 2019-10-07 NOTE — PROGRESS NOTES
Daily Note     Today's date: 10/7/2019  Patient name: Maria Teresa Montgomery  : 1965  MRN: [id number]  Referring provider: Justin Pennington DO  Dx:   Encounter Diagnosis     ICD-10-CM    1  Chronic bilateral low back pain with bilateral sciatica M54 42     M54 41     G89 29                   Subjective: Patient reports mild reduction in pain/symptoms pre-tx but notes current radicular symptoms into her right foot  Objective: See treatment diary below      Assessment: Patient demonstrating continued right LE sciatic nerve tension  Significant piriformis tightness remains on the right despite HEP compliance  Began hip abductor strengthening and patient tolerated well  Plan: Continue per plan of care        Precautions: Myxedema      Manual  10/1 10/7           L sidelying for R lumbopelvic JM Grade IV, Grade V Grade IV           R piriformis release  5'           R piriformis stretch  4x30"                                         Exercise Diary  10/1 10/7           TM warm up             Piriformis stretch 4x30" on R 4x30" on R           Piriformis release w/ lax ball 4x30" on R 4x30" on R           Slump nerve glides on R 2x10 2x10           Standing hip abduction w/ TB  [place] 3x10 B/L           S/L clamshell  Pink  3x10 B/L           Hip extension at EOT  2x10 B/L           Q-ped hip extension  NV           Multi angle clamshell  NV                                                                                                                                                              Modalities

## 2019-10-17 ENCOUNTER — OFFICE VISIT (OUTPATIENT)
Dept: PHYSICAL THERAPY | Facility: CLINIC | Age: 54
End: 2019-10-17
Payer: COMMERCIAL

## 2019-10-17 DIAGNOSIS — M54.41 CHRONIC BILATERAL LOW BACK PAIN WITH BILATERAL SCIATICA: Primary | ICD-10-CM

## 2019-10-17 DIAGNOSIS — G89.29 CHRONIC BILATERAL LOW BACK PAIN WITH BILATERAL SCIATICA: Primary | ICD-10-CM

## 2019-10-17 DIAGNOSIS — M54.42 CHRONIC BILATERAL LOW BACK PAIN WITH BILATERAL SCIATICA: Primary | ICD-10-CM

## 2019-10-17 PROCEDURE — 97140 MANUAL THERAPY 1/> REGIONS: CPT | Performed by: PHYSICAL THERAPIST

## 2019-10-17 PROCEDURE — 97112 NEUROMUSCULAR REEDUCATION: CPT | Performed by: PHYSICAL THERAPIST

## 2019-10-17 PROCEDURE — 97110 THERAPEUTIC EXERCISES: CPT | Performed by: PHYSICAL THERAPIST

## 2019-10-17 NOTE — PROGRESS NOTES
Daily Note     Today's date: 10/17/2019  Patient name: Helga High  : 1965  MRN: [id number]  Referring provider: Richy Tomas DO  Dx:   Encounter Diagnosis     ICD-10-CM    1  Chronic bilateral low back pain with bilateral sciatica M54 42     M54 41     G89 29                   Subjective: Patient reports resolved radicular symptoms into her right LE but notes increased lumbar symptoms  Objective: See treatment diary below      Assessment: Patient educated regarding progression of her centralizing radicular symptoms and to expect this increase in the lumbar spine until that begins to resolve  Began combined core stabilization with proximal LE strengthening and patient tolerated well  Plan: Continue per plan of care  Precautions: Myxedema      Manual  10/1 10/7 10/17          L sidelying for R lumbopelvic JM Grade IV, Grade V Grade IV Grade IV          R piriformis release  5' 5'          R piriformis stretch  4x30" 4x30"                                        Exercise Diary  10/1 10/7 10/17          TM warm up             Piriformis stretch 4x30" on R 4x30" on R 4x30" on R          Piriformis release w/ lax ball 4x30" on R 4x30" on R           Slump nerve glides on R 2x10 2x10 2x10          Standing hip abduction w/ TB  [place] 3x10 B/L Pink 3x10 B/L          S/L clamshell  Pink  3x10 B/L Pink 3x10 B/L          Hip extension at EOT  2x10 B/L           Q-ped hip extension  NV 2x10 B/L          Multi angle clamshell  NV GTB  2x10 ea                                                                                                                                                               Modalities

## 2019-10-21 ENCOUNTER — APPOINTMENT (OUTPATIENT)
Dept: PHYSICAL THERAPY | Facility: CLINIC | Age: 54
End: 2019-10-21
Payer: COMMERCIAL

## 2019-10-21 NOTE — PROGRESS NOTES
Daily Note     Today's date: 10/21/2019  Patient name: Paul Chavez  : 1965  MRN: [id number]  Referring provider: Maurilio Green DO  Dx: No diagnosis found  Subjective: Patient gain reports no radicular symptoms into her right LE with mild reduction of lumbar pain  Objective: See treatment diary below      Assessment: Patient demonstrating improvement in reduction of right piriformis/erector spinar tension but moderate glut max tightness present with palpation secondary to continued weakness of proximal musculature  Improved movement control with core stability exercises allowing for progression next session  Plan: Continue per plan of care  Precautions: Myxedema      Manual  10/1 10/7 10/17 10/21         L sidelying for R lumbopelvic JM Grade IV, Grade V Grade IV Grade IV Grade IV         R piriformis release  5' 5' 5'         R piriformis stretch  4x30" 4x30" 4x30"                                       Exercise Diary  10/1 10/7 10/17 10/21         TM warm up             Piriformis stretch 4x30" on R 4x30" on R 4x30" on R 4x30" on R         Piriformis release w/ lax ball 4x30" on R 4x30" on R           Slump nerve glides on R 2x10 2x10 2x10 2x10         Standing hip abduction w/ TB  [place] 3x10 B/L Pink 3x10 B/L Pink 3x10 B/L         S/L clamshell  Pink  3x10 B/L Pink 3x10 B/L Pink 3x10 B/L         Hip extension at EOT  2x10 B/L           Q-ped hip extension  NV 2x10 B/L 2x10 B/L         Multi angle clamshell  NV GTB  2x10 ea  GTB  2x10 ea           Multifidus press    NV                                                                                                                                               Modalities

## 2019-10-24 ENCOUNTER — OFFICE VISIT (OUTPATIENT)
Dept: PHYSICAL THERAPY | Facility: CLINIC | Age: 54
End: 2019-10-24
Payer: COMMERCIAL

## 2019-10-24 DIAGNOSIS — M54.42 CHRONIC BILATERAL LOW BACK PAIN WITH BILATERAL SCIATICA: Primary | ICD-10-CM

## 2019-10-24 DIAGNOSIS — M54.41 CHRONIC BILATERAL LOW BACK PAIN WITH BILATERAL SCIATICA: Primary | ICD-10-CM

## 2019-10-24 DIAGNOSIS — G89.29 CHRONIC BILATERAL LOW BACK PAIN WITH BILATERAL SCIATICA: Primary | ICD-10-CM

## 2019-10-24 PROCEDURE — 97140 MANUAL THERAPY 1/> REGIONS: CPT | Performed by: PHYSICAL THERAPIST

## 2019-10-24 PROCEDURE — 97112 NEUROMUSCULAR REEDUCATION: CPT | Performed by: PHYSICAL THERAPIST

## 2019-10-24 PROCEDURE — 97110 THERAPEUTIC EXERCISES: CPT | Performed by: PHYSICAL THERAPIST

## 2019-10-24 NOTE — PROGRESS NOTES
Daily Note     Today's date: 10/24/2019  Patient name: Tawnya Monteiro  : 1965  MRN: [id number]  Referring provider: Zoila Treviño DO  Dx:   Encounter Diagnosis     ICD-10-CM    1  Chronic bilateral low back pain with bilateral sciatica M54 42     M54 41     G89 29                   Subjective: Patient reports compliance with her HEP and states that her radicular symptoms remains resolved but tension/pain in lumbar spine noted but reduced since prior session  Objective: See treatment diary below      Assessment: Patient demonstrating mild improvement in glut medius activation and symptoms are beginning to centralize  Progressed core stabilization exercises noted below and patient tolerated well without pain or reproduction of symptoms  Plan: Continue per plan of care  Precautions: Myxedema      Manual  10/1 10/7 10/17 10/21 10/24        L sidelying for R lumbopelvic JM Grade IV, Grade V Grade IV Grade IV Grade IV Grade IV        R piriformis release  5' 5' 5' 5'        R piriformis stretch  4x30" 4x30" 4x30" 4x30"                                      Exercise Diary  10/1 10/7 10/17 10/21 10/24        TM warm up             Piriformis stretch 4x30" on R 4x30" on R 4x30" on R 4x30" on R 4x30" on R        Piriformis release w/ lax ball 4x30" on R 4x30" on R           Slump nerve glides on R 2x10 2x10 2x10 2x10 2x10        Standing hip abduction w/ TB  [place] 3x10 B/L [place] 3x10 B/L Pink 3x10 B/L [place] 3x10 B/L        S/L clamshell  [place]  3x10 B/L Pink 3x10 B/L Pink 3x10 B/L [place] 3x10 B/L        Hip extension at EOT  2x10 B/L           Q-ped hip extension  NV 2x10 B/L 2x10 B/L 2x10 B/L        Multi angle clamshell  NV GTB  2x10 ea  GTB  2x10 ea  GTB  2x10 ea          Multifidus press    NV GTB  1x10 B/L                                                                                                                                              Modalities

## 2019-10-30 ENCOUNTER — OFFICE VISIT (OUTPATIENT)
Dept: PHYSICAL THERAPY | Facility: CLINIC | Age: 54
End: 2019-10-30
Payer: COMMERCIAL

## 2019-10-30 DIAGNOSIS — M54.42 CHRONIC BILATERAL LOW BACK PAIN WITH BILATERAL SCIATICA: Primary | ICD-10-CM

## 2019-10-30 DIAGNOSIS — M54.41 CHRONIC BILATERAL LOW BACK PAIN WITH BILATERAL SCIATICA: Primary | ICD-10-CM

## 2019-10-30 DIAGNOSIS — G89.29 CHRONIC BILATERAL LOW BACK PAIN WITH BILATERAL SCIATICA: Primary | ICD-10-CM

## 2019-10-30 PROCEDURE — 97140 MANUAL THERAPY 1/> REGIONS: CPT | Performed by: PHYSICAL THERAPIST

## 2019-10-30 PROCEDURE — 97110 THERAPEUTIC EXERCISES: CPT | Performed by: PHYSICAL THERAPIST

## 2019-10-30 PROCEDURE — 97112 NEUROMUSCULAR REEDUCATION: CPT | Performed by: PHYSICAL THERAPIST

## 2019-10-30 NOTE — PROGRESS NOTES
Daily Note     Today's date: 10/30/2019  Patient name: Paul Chavez  : 1965  MRN: [id number]  Referring provider: Maurilio Green DO  Dx:   Encounter Diagnosis     ICD-10-CM    1  Chronic bilateral low back pain with bilateral sciatica M54 42     M54 41     G89 29                   Subjective: Patient reports return of mild symptoms into her right buttock but states that this is occurring following her aerobics classes, specifically with an abduction kick  Objective: See treatment diary below      Assessment: Right piriformis tension remains and patient responded well to manual intervention  Continued glut medius strengthening noted below and updated HEP to include mutli angle upright clamshell  Plan: Continue per plan of care  Precautions: Myxedema      Manual  10/1 10/7 10/17 10/21 10/24 10/30       L sidelying for R lumbopelvic JM Grade IV, Grade V Grade IV Grade IV Grade IV Grade IV Grade IV       R piriformis release  5' 5' 5' 5' 5'       R piriformis stretch  4x30" 4x30" 4x30" 4x30" 4x30"                                     Exercise Diary  10/1 10/7 10/17 10/21 10/24 10/30       TM warm up      2 0 mph    25 mi       Piriformis stretch 4x30" on R 4x30" on R 4x30" on R 4x30" on R 4x30" on R 4x30" on R       Piriformis release w/ lax ball 4x30" on R 4x30" on R           Slump nerve glides on R 2x10 2x10 2x10 2x10 2x10 2x10       Standing hip abduction w/ TB  [place] 3x10 B/L Pink 3x10 B/L Pink 3x10 B/L [place] 3x10 B/L [id number] B/L       S/L clamshell  [place]  3x10 B/L Pink 3x10 B/L Pink 3x10 B/L [place] 3x10 B/L [id number] B/L       Hip extension at EOT  2x10 B/L           Q-ped hip extension  NV 2x10 B/L 2x10 B/L 2x10 B/L 2x10 B/L       Multi angle clamshell  NV GTB  2x10 ea  GTB  2x10 ea  GTB  2x10 ea  GTB  2x10 ea         Multifidus press    NV GTB  1x10 B/L [place] attended physical therapy from 10/1/2019 until 10/30/2019 for 5 treatment sessions regarding LBP  Patient made improvement throughout treatment but am unable to achieve discharge information secondary to patient not returning for follow up appointments  Patient will be discharged from PT at this time  Thank you

## 2020-03-16 DIAGNOSIS — E03.9 HYPOTHYROIDISM, UNSPECIFIED TYPE: ICD-10-CM

## 2020-03-16 RX ORDER — LEVOTHYROXINE SODIUM 0.05 MG/1
50 TABLET ORAL DAILY
Qty: 90 TABLET | Refills: 1 | Status: SHIPPED | OUTPATIENT
Start: 2020-03-16 | End: 2021-02-03 | Stop reason: SDUPTHER

## 2020-06-25 ENCOUNTER — TELEPHONE (OUTPATIENT)
Dept: FAMILY MEDICINE CLINIC | Facility: CLINIC | Age: 55
End: 2020-06-25

## 2020-06-25 DIAGNOSIS — M54.50 ACUTE BILATERAL LOW BACK PAIN WITHOUT SCIATICA: ICD-10-CM

## 2020-06-25 RX ORDER — CYCLOBENZAPRINE HCL 5 MG
5 TABLET ORAL 3 TIMES DAILY PRN
Qty: 90 TABLET | Refills: 0 | Status: SHIPPED | OUTPATIENT
Start: 2020-06-25 | End: 2021-05-12 | Stop reason: SDUPTHER

## 2021-01-13 ENCOUNTER — TELEPHONE (OUTPATIENT)
Dept: FAMILY MEDICINE CLINIC | Facility: CLINIC | Age: 56
End: 2021-01-13

## 2021-01-13 NOTE — TELEPHONE ENCOUNTER
Pt asked for SL  bw to be put in, including Vitamin D and orders for bw for arthritis,stiffness, aches and pains  Arthritis runs in family

## 2021-01-14 DIAGNOSIS — E55.9 VITAMIN D DEFICIENCY: ICD-10-CM

## 2021-01-14 DIAGNOSIS — E03.9 ACQUIRED HYPOTHYROIDISM: Primary | ICD-10-CM

## 2021-01-14 DIAGNOSIS — R73.9 ELEVATED BLOOD SUGAR: ICD-10-CM

## 2021-01-14 DIAGNOSIS — E78.2 MIXED HYPERLIPIDEMIA: ICD-10-CM

## 2021-01-14 DIAGNOSIS — M25.50 MULTIPLE JOINT PAIN: ICD-10-CM

## 2021-01-14 NOTE — TELEPHONE ENCOUNTER
I put blood work orders in her vitamin-D, cholesterol, CMP, TSH, A1c and rheumatoid factor, LB C-reactive protein  There is no blood work for osteoarthritis  These would just given indication of rheumatoid arthritis or possibly other connective tissue diseases

## 2021-01-27 LAB — HBA1C MFR BLD HPLC: 5.5 %

## 2021-01-28 ENCOUNTER — TELEPHONE (OUTPATIENT)
Dept: FAMILY MEDICINE CLINIC | Facility: CLINIC | Age: 56
End: 2021-01-28

## 2021-02-03 DIAGNOSIS — E03.9 HYPOTHYROIDISM, UNSPECIFIED TYPE: ICD-10-CM

## 2021-02-03 RX ORDER — LEVOTHYROXINE SODIUM 0.05 MG/1
50 TABLET ORAL DAILY
Qty: 90 TABLET | Refills: 0 | Status: SHIPPED | OUTPATIENT
Start: 2021-02-03 | End: 2021-06-17 | Stop reason: SDUPTHER

## 2021-02-13 ENCOUNTER — TELEPHONE (OUTPATIENT)
Dept: FAMILY MEDICINE CLINIC | Facility: CLINIC | Age: 56
End: 2021-02-13

## 2021-02-13 ENCOUNTER — OFFICE VISIT (OUTPATIENT)
Dept: FAMILY MEDICINE CLINIC | Facility: CLINIC | Age: 56
End: 2021-02-13
Payer: COMMERCIAL

## 2021-02-13 VITALS
WEIGHT: 204.2 LBS | HEIGHT: 63 IN | OXYGEN SATURATION: 99 % | BODY MASS INDEX: 36.18 KG/M2 | HEART RATE: 86 BPM | SYSTOLIC BLOOD PRESSURE: 130 MMHG | TEMPERATURE: 98.4 F | DIASTOLIC BLOOD PRESSURE: 82 MMHG

## 2021-02-13 DIAGNOSIS — E78.2 MIXED HYPERLIPIDEMIA: ICD-10-CM

## 2021-02-13 DIAGNOSIS — Z00.00 ANNUAL PHYSICAL EXAM: ICD-10-CM

## 2021-02-13 DIAGNOSIS — Z00.00 WELL ADULT EXAM: Primary | ICD-10-CM

## 2021-02-13 DIAGNOSIS — E03.9 ACQUIRED HYPOTHYROIDISM: ICD-10-CM

## 2021-02-13 DIAGNOSIS — M76.60 ACHILLES TENDON PAIN: ICD-10-CM

## 2021-02-13 DIAGNOSIS — G56.03 BILATERAL CARPAL TUNNEL SYNDROME: ICD-10-CM

## 2021-02-13 DIAGNOSIS — R73.9 ELEVATED BLOOD SUGAR: ICD-10-CM

## 2021-02-13 PROCEDURE — 99396 PREV VISIT EST AGE 40-64: CPT | Performed by: FAMILY MEDICINE

## 2021-02-13 PROCEDURE — 3725F SCREEN DEPRESSION PERFORMED: CPT | Performed by: FAMILY MEDICINE

## 2021-02-13 PROCEDURE — 3008F BODY MASS INDEX DOCD: CPT | Performed by: FAMILY MEDICINE

## 2021-02-13 PROCEDURE — 1036F TOBACCO NON-USER: CPT | Performed by: FAMILY MEDICINE

## 2021-02-13 NOTE — PROGRESS NOTES
ADULT ANNUAL [address]  [facility]    NAME: Tommie Kothari  AGE: 48 y o  SEX: female  : 1965     DATE: 2/15/2021     Assessment and Plan:   Patient is a 48year old seen for welll exam   We reviewed her blood work  Chol elevated at 248 and   She watches her diet and exercises regularly  Discussed that some of this may be genetic  She had sen a dietician many years ago and was told that her diet is good  I did recommend red yeast rice 1200 mg daily  Also discussed weight - may consider Metformin or Saxenda since her fasting sugar is elevated, although A1C is negative  Problem List Items Addressed This Visit        Endocrine    Hypothyroidism       Other    Elevated blood sugar    Hyperlipidemia      Other Visit Diagnoses     Well adult exam    -  Primary    Achilles tendon pain        Relevant Orders    Ambulatory referral to Podiatry    Annual physical exam        Bilateral carpal tunnel syndrome        Relevant Orders    Cock Up Wrist Splint        I did refer her to podiatry regarding achilles tendon abnormality and pain  Addendum: Patient had asked about treatment for carpal tunnel  She does not want anEMG and does not want medication or to see a specialist for possible surgery  Will give RX for cock up splints  Immunizations and preventive care screenings were discussed with patient today  Appropriate education was printed on patient's after visit summary  Counseling:  Alcohol/drug use: discussed moderation in alcohol intake, the recommendations for healthy alcohol use, and avoidance of illicit drug use  Dental Health: discussed importance of regular tooth brushing, flossing, and dental visits  · Exercise: the importance of regular exercise/physical activity was discussed  Recommend exercise 3-5 times per week for at least 30 minutes  Return in about 1 year (around 2022)       Chief Complaint:     No chief complaint on file  History of Present Illness:     Adult Annual Physical   Patient here for a comprehensive physical exam  The patient reports problems -    Right ankle - achilles are has swelling and is painful  Is able to walk twice a day  So can't do step class  Is doing elliptical  Issues with weight  Diet and Physical Activity  · Diet/Nutrition: well balanced diet  · Exercise: moderate cardiovascular exercise  Depression Screening  PHQ-9 Depression Screening    PHQ-9:   Frequency of the following problems over the past two weeks:      Little interest or pleasure in doing things: 0 - not at all  Feeling down, depressed, or hopeless: 0 - not at all  PHQ-2 Score: 0       General Health  · Sleep: sleeps poorly  · Hearing: normal - bilateral   · Vision: no vision problems  · Dental: regular dental visits  /GYN Health  · Patient is: postmenopausal       Review of Systems:     Review of Systems   Constitutional: Negative for chills and fever  HENT: Negative for congestion and sore throat  Respiratory: Negative for chest tightness  Cardiovascular: Negative for chest pain and palpitations  Gastrointestinal: Negative for abdominal pain, constipation, diarrhea and nausea  Genitourinary: Negative for difficulty urinating  Musculoskeletal: Positive for arthralgias  Skin: Negative  Neurological: Negative for dizziness and headaches  Psychiatric/Behavioral: Negative         Past Medical History:     Past Medical History:   Diagnosis Date    Allergic     Allergic rhinitis     Last Assessed:2012    Disease of thyroid gland     Myxedema     Last Assessed:2013      Past Surgical History:     Past Surgical History:   Procedure Laterality Date     SECTION      CHOLECYSTECTOMY        Social History:        Social History     Socioeconomic History    Marital status: /Civil Union     Spouse name: None    Number of children: None    Years of education: None  Highest education level: None   Occupational History    None   Social Needs    Financial resource strain: None    Food insecurity     Worry: None     Inability: None    Transportation needs     Medical: None     Non-medical: None   Tobacco Use    Smoking status: Never Smoker    Smokeless tobacco: Never Used   Substance and Sexual Activity    Alcohol use:  Yes     Alcohol/week: 0 0 standard drinks     Frequency: Monthly or less     Drinks per session: 1 or 2     Binge frequency: Never     Comment: occasional    Drug use: No    Sexual activity: Yes     Partners: Male     Birth control/protection: Male Sterilization   Lifestyle    Physical activity     Days per week: None     Minutes per session: None    Stress: None   Relationships    Social connections     Talks on phone: None     Gets together: None     Attends [employer] service: None     Active member of club or organization: None     Attends meetings of clubs or organizations: None     Relationship status: None    Intimate partner violence     Fear of current or ex partner: None     Emotionally abused: None     Physically abused: None     Forced sexual activity: None   Other Topics Concern    None   Social History Narrative    None      Family History:     Family History   Problem Relation Age of Onset    Lupus Mother     Heart defect Mother     Kidney disease Mother     Arthritis Mother     Rheum arthritis Mother     Lung cancer Father     Cancer Father     Pulmonary embolism Father       Current Medications:     Current Outpatient Medications   Medication Sig Dispense Refill    calcium citrate-vitamin D (CITRACAL+D) 315-200 MG-UNIT per tablet Take 1 tablet by mouth 2 (two) times a day      cyclobenzaprine (FLEXERIL) 5 mg tablet Take 1 tablet (5 mg total) by mouth 3 (three) times a day as needed for muscle spasms 90 tablet 0    levothyroxine 50 mcg tablet Take 1 tablet (50 mcg total) by mouth daily 90 tablet 0    Multiple Vitamin (MULTIVITAMIN) capsule Take 1 capsule by mouth daily      Omega-3 Fatty Acids (FISH OIL) 1,000 mg Take 1,000 mg by mouth daily       No current facility-administered medications for this visit  Allergies: Allergies   Allergen Reactions    Codeine GI Intolerance    Tramadol GI Intolerance      Physical Exam:     /82 (BP Location: Left arm, Patient Position: Sitting, Cuff Size: Large)   Pulse 86   Temp 98 4 °F (36 9 °C)   Ht 5' 2 6" (1 59 m)   Wt 92 6 kg (204 lb 3 2 oz)   LMP 03/13/2018   SpO2 99%   BMI 36 64 kg/m²     Physical Exam  Vitals signs and nursing note reviewed  Constitutional:       General: She is not in acute distress  Appearance: She is well-developed  HENT:      Head: Normocephalic  Right Ear: Tympanic membrane normal       Left Ear: Tympanic membrane normal    Eyes:      Pupils: Pupils are equal, round, and reactive to light  Neck:      Thyroid: No thyromegaly  Vascular: No carotid bruit  Cardiovascular:      Rate and Rhythm: Normal rate and regular rhythm  Heart sounds: Normal heart sounds  No murmur  Pulmonary:      Effort: Pulmonary effort is normal       Breath sounds: Normal breath sounds  Abdominal:      General: Bowel sounds are normal       Palpations: Abdomen is soft  Musculoskeletal:      Right lower leg: No edema  Left lower leg: No edema  Comments: Right achilles tendon swollen and tender   Lymphadenopathy:      Cervical: No cervical adenopathy  Skin:     General: Skin is warm and dry  Neurological:      Mental Status: She is alert and oriented to person, place, and time     Psychiatric:         Mood and Affect: Mood normal           Yonis Rock DO  [address]

## 2021-02-13 NOTE — TELEPHONE ENCOUNTER
Pt called after visit to ask what type of wrist splint you wanted her to get for her carpal tunnel    Please advice    Thank you   [phone]

## 2021-02-13 NOTE — PATIENT INSTRUCTIONS
Red yeast rice 1200 mg daily    Think about metformin or Saxenda for weight loss  Wellness Visit for Adults   AMBULATORY CARE:   A wellness visit  is when you see your healthcare provider to get screened for health problems  Your healthcare provider will also give you advice on how to stay healthy  Write down your questions so you remember to ask them  Ask your healthcare provider how often you should have a wellness visit  What happens at a wellness visit:  Your healthcare provider will ask about your health, and your family history of health problems  This includes high blood pressure, heart disease, and cancer  He or she will ask if you have symptoms that concern you, if you smoke, and about your mood  You may also be asked about your intake of medicines, supplements, food, and alcohol  Any of the following may be done:  · Your weight  will be checked  Your height may also be checked so your body mass index (BMI) can be calculated  Your BMI shows if you are at a healthy weight  · Your blood pressure  and heart rate will be checked  Your temperature may also be checked  · Blood and urine tests  may be done  Blood tests may be done to check your cholesterol levels  Abnormal cholesterol levels increase your risk for heart disease and stroke  You may also need a blood or urine test to check for diabetes if you are at increased risk  Urine tests may be done to look for signs of an infection or kidney disease  · A physical exam  includes checking your heartbeat and lungs with a stethoscope  Your healthcare provider may also check your skin to look for sun damage  · Screening tests  may be recommended  A screening test is done to check for diseases that may not cause symptoms  The screening tests you may need depend on your age, gender, family history, and lifestyle habits  For example, colorectal screening may be recommended if you are 48years old or older      Screening tests you need if you are a woman: · A Pap smear  is used to screen for cervical cancer  Pap smears are usually done every 3 to 5 years depending on your age  You may need them more often if you have had abnormal Pap smear test results in the past  Ask your healthcare provider how often you should have a Pap smear  · A mammogram  is an x-ray of your breasts to screen for breast cancer  Experts recommend mammograms every 2 years starting at age 48 years  You may need a mammogram at age 52 years or younger if you have an increased risk for breast cancer  Talk to your healthcare provider about when you should start having mammograms and how often you need them  Vaccines you may need:   · Get an influenza vaccine  every year  The influenza vaccine protects you from the flu  Several types of viruses cause the flu  The viruses change over time, so new vaccines are made each year  · Get a tetanus-diphtheria (Td) booster vaccine  every 10 years  This vaccine protects you against tetanus and diphtheria  Tetanus is a severe infection that may cause painful muscle spasms and lockjaw  Diphtheria is a severe bacterial infection that causes a thick covering in the back of your mouth and throat  · Get a human papillomavirus (HPV) vaccine  if you are female and aged 23 to 32 or male 23 to 24 and never received it  This vaccine protects you from HPV infection  HPV is the most common infection spread by sexual contact  HPV may also cause vaginal, penile, and anal cancers  · Get a pneumococcal vaccine  if you are aged 72 years or older  The pneumococcal vaccine is an injection given to protect you from pneumococcal disease  Pneumococcal disease is an infection caused by pneumococcal bacteria  The infection may cause pneumonia, meningitis, or an ear infection  · Get a shingles vaccine  if you are 60 or older, even if you have had shingles before  The shingles vaccine is an injection to protect you from the varicella-zoster virus   This is the same virus that causes chickenpox  Shingles is a painful rash that develops in people who had chickenpox or have been exposed to the virus  How to eat healthy:  My Plate is a model for planning healthy meals  It shows the types and amounts of foods that should go on your plate  Fruits and vegetables make up about half of your plate, and grains and protein make up the other half  A serving of dairy is included on the side of your plate  The amount of calories and serving sizes you need depends on your age, gender, weight, and height  Examples of healthy foods are listed below:  · Eat a variety of vegetables  such as dark green, red, and orange vegetables  You can also include canned vegetables low in sodium (salt) and frozen vegetables without added butter or sauces  · Eat a variety of fresh fruits , canned fruit in 100% juice, frozen fruit, and dried fruit  · Include whole grains  At least half of the grains you eat should be whole grains  Examples include whole-wheat bread, wheat pasta, brown rice, and whole-grain cereals such as oatmeal     · Eat a variety of protein foods such as seafood (fish and shellfish), lean meat, and poultry without skin (turkey and chicken)  Examples of lean meats include pork leg, shoulder, or tenderloin, and beef round, sirloin, tenderloin, and extra lean ground beef  Other protein foods include eggs and egg substitutes, beans, peas, soy products, nuts, and seeds  · Choose low-fat dairy products such as skim or 1% milk or low-fat yogurt, cheese, and cottage cheese  · Limit unhealthy fats  such as butter, hard margarine, and shortening  Exercise:  Exercise at least 30 minutes per day on most days of the week  Some examples of exercise include walking, biking, dancing, and swimming  You can also fit in more physical activity by taking the stairs instead of the elevator or parking farther away from stores  Include muscle strengthening activities 2 days each week   Regular exercise provides many health benefits  It helps you manage your weight, and decreases your risk for type 2 diabetes, heart disease, stroke, and high blood pressure  Exercise can also help improve your mood  Ask your healthcare provider about the best exercise plan for you  General health and safety guidelines:   · Do not smoke  Nicotine and other chemicals in cigarettes and cigars can cause lung damage  Ask your healthcare provider for information if you currently smoke and need help to quit  E-cigarettes or smokeless tobacco still contain nicotine  Talk to your healthcare provider before you use these products  · Limit alcohol  A drink of alcohol is 12 ounces of beer, 5 ounces of wine, or 1½ ounces of liquor  · Lose weight, if needed  Being overweight increases your risk of certain health conditions  These include heart disease, high blood pressure, type 2 diabetes, and certain types of cancer  · Protect your skin  Do not sunbathe or use tanning beds  Use sunscreen with a SPF 15 or higher  Apply sunscreen at least 15 minutes before you go outside  Reapply sunscreen every 2 hours  Wear protective clothing, hats, and sunglasses when you are outside  · Drive safely  Always wear your seatbelt  Make sure everyone in your car wears a seatbelt  A seatbelt can save your life if you are in an accident  Do not use your cell phone when you are driving  This could distract you and cause an accident  Pull over if you need to make a call or send a text message  · Practice safe sex  Use latex condoms if are sexually active and have more than one partner  Your healthcare provider may recommend screening tests for sexually transmitted infections (STIs)  · Wear helmets, lifejackets, and protective gear  Always wear a helmet when you ride a bike or motorcycle, go skiing, or play sports that could cause a head injury  Wear protective equipment when you play sports   Wear a lifejacket when you are on a boat or doing water sports  © Copyright [address] Information is for End User's use only and may not be sold, redistributed or otherwise used for commercial purposes  All illustrations and images included in CareNotes® are the copyrighted property of A D A M [employer] Inc  or Judith Wright  The above information is an  only  It is not intended as medical advice for individual conditions or treatments  Talk to your doctor, nurse or pharmacist before following any medical regimen to see if it is safe and effective for you

## 2021-02-15 ENCOUNTER — TELEPHONE (OUTPATIENT)
Dept: ADMINISTRATIVE | Facility: OTHER | Age: 56
End: 2021-02-15

## 2021-02-15 NOTE — TELEPHONE ENCOUNTER
----- Message from Mj Martinez MA sent at 2/13/2021 10:29 AM EST -----  Regarding: Care Gap Request  02/13/21 10:29 AM    Hello, our patient Tawnya Monteiro has had Mammogram completed/performed  Please assist in updating the patient chart by pulling the Care Everywhere (CE) document  The date of service is 2/10/21       Thank you,  Mj Martinez MA  [facility]

## 2021-02-15 NOTE — TELEPHONE ENCOUNTER
Upon review of the In Basket request we were able to locate, review, and update the patient chart as requested for Mammogram and Pap Smear (HPV) aka Cervical Cancer Screening  Any additional questions or concerns should be emailed to the Practice Liaisons via [email or web address] email, please do not reply via In Basket      Thank you  Svetlana Tolbert MA

## 2021-02-15 NOTE — TELEPHONE ENCOUNTER
Sent patient an e-mail that I put an order for cock-up splints in her chart  They should have printed out up front according to the computer  Not sure if patient wants it faxed somewhere or if she will pick it up?

## 2021-02-15 NOTE — TELEPHONE ENCOUNTER
----- Message from Imani Young MA sent at 2/13/2021 10:29 AM EST -----  Regarding: Care Gap Request  02/13/21 10:29 AM    Hello, our patient Helga High has had Pap Smear (HPV) aka Cervical Cancer Screening completed/performed  Please assist in updating the patient chart by pulling the Care Everywhere (CE) document  The date of service is 10/17/19       Thank you,  Imani Young MA  [facility]

## 2021-03-04 PROBLEM — E55.9 VITAMIN D DEFICIENCY: Status: ACTIVE | Noted: 2021-03-04

## 2021-03-04 PROBLEM — J30.2 CHRONIC SEASONAL ALLERGIC RHINITIS: Status: ACTIVE | Noted: 2017-05-20

## 2021-03-04 PROBLEM — N95.1 PERIMENOPAUSE: Status: RESOLVED | Noted: 2018-05-22 | Resolved: 2021-03-04

## 2021-05-12 DIAGNOSIS — M54.50 ACUTE BILATERAL LOW BACK PAIN WITHOUT SCIATICA: ICD-10-CM

## 2021-05-12 RX ORDER — CYCLOBENZAPRINE HCL 5 MG
5 TABLET ORAL 3 TIMES DAILY PRN
Qty: 90 TABLET | Refills: 0 | Status: SHIPPED | OUTPATIENT
Start: 2021-05-12 | End: 2022-04-04 | Stop reason: SDUPTHER

## 2021-06-17 DIAGNOSIS — E03.9 HYPOTHYROIDISM, UNSPECIFIED TYPE: ICD-10-CM

## 2021-06-18 RX ORDER — LEVOTHYROXINE SODIUM 0.05 MG/1
50 TABLET ORAL DAILY
Qty: 90 TABLET | Refills: 0 | Status: SHIPPED | OUTPATIENT
Start: 2021-06-18 | End: 2021-10-14 | Stop reason: SDUPTHER

## 2021-10-14 DIAGNOSIS — E03.9 HYPOTHYROIDISM, UNSPECIFIED TYPE: ICD-10-CM

## 2021-10-15 RX ORDER — LEVOTHYROXINE SODIUM 0.05 MG/1
50 TABLET ORAL DAILY
Qty: 90 TABLET | Refills: 0 | Status: SHIPPED | OUTPATIENT
Start: 2021-10-15 | End: 2022-03-21 | Stop reason: SDUPTHER

## 2022-02-05 ENCOUNTER — APPOINTMENT (EMERGENCY)
Dept: CT IMAGING | Facility: HOSPITAL | Age: 57
End: 2022-02-05
Payer: COMMERCIAL

## 2022-02-05 ENCOUNTER — HOSPITAL ENCOUNTER (EMERGENCY)
Facility: HOSPITAL | Age: 57
Discharge: HOME | End: 2022-02-05
Attending: EMERGENCY MEDICINE | Admitting: EMERGENCY MEDICINE
Payer: COMMERCIAL

## 2022-02-05 ENCOUNTER — OFFICE VISIT (OUTPATIENT)
Dept: URGENT CARE | Facility: MEDICAL CENTER | Age: 57
End: 2022-02-05
Payer: COMMERCIAL

## 2022-02-05 VITALS
OXYGEN SATURATION: 99 % | TEMPERATURE: 97.7 F | SYSTOLIC BLOOD PRESSURE: 132 MMHG | RESPIRATION RATE: 18 BRPM | DIASTOLIC BLOOD PRESSURE: 67 MMHG | HEART RATE: 72 BPM

## 2022-02-05 VITALS
HEART RATE: 60 BPM | DIASTOLIC BLOOD PRESSURE: 57 MMHG | OXYGEN SATURATION: 99 % | BODY MASS INDEX: 36 KG/M2 | TEMPERATURE: 97.3 F | WEIGHT: 200 LBS | SYSTOLIC BLOOD PRESSURE: 119 MMHG

## 2022-02-05 DIAGNOSIS — S01.01XA LACERATION OF SCALP, INITIAL ENCOUNTER: ICD-10-CM

## 2022-02-05 DIAGNOSIS — R55 VASOVAGAL SYNCOPE: ICD-10-CM
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
## 2022-02-05 DIAGNOSIS — R55 VASOVAGAL EPISODE: Primary | ICD-10-CM

## 2022-02-05 DIAGNOSIS — S01.81XA LACERATION OF OTHER PART OF HEAD WITHOUT FOREIGN BODY, INITIAL ENCOUNTER: ICD-10-CM

## 2022-02-05 LAB
ANION GAP SERPL CALCULATED.3IONS-SCNC: 10 MMOL/L (ref 4–13)
BASOPHILS # BLD AUTO: 0.04 THOUSANDS/ΜL (ref 0–0.1)
BASOPHILS NFR BLD AUTO: 1 % (ref 0–1)
BUN SERPL-MCNC: 15 MG/DL (ref 5–25)
CALCIUM SERPL-MCNC: 8.5 MG/DL (ref 8.3–10.1)
CARDIAC TROPONIN I PNL SERPL HS: 2 NG/L
CHLORIDE SERPL-SCNC: 109 MMOL/L (ref 100–108)
CO2 SERPL-SCNC: 24 MMOL/L (ref 21–32)
CREAT SERPL-MCNC: 0.93 MG/DL (ref 0.6–1.3)
EOSINOPHIL # BLD AUTO: 0.08 THOUSAND/ΜL (ref 0–0.61)
EOSINOPHIL NFR BLD AUTO: 1 % (ref 0–6)
ERYTHROCYTE [DISTWIDTH] IN BLOOD BY AUTOMATED COUNT: 13.3 % (ref 11.6–15.1)
GFR SERPL CREATININE-BSD FRML MDRD: 69 ML/MIN/1.73SQ M
GLUCOSE SERPL-MCNC: 111 MG/DL (ref 65–140)
GLUCOSE SERPL-MCNC: 121 MG/DL (ref 65–140)
HCT VFR BLD AUTO: 49.9 % (ref 34.8–46.1)
HGB BLD-MCNC: 16.2 G/DL (ref 11.5–15.4)
IMM GRANULOCYTES # BLD AUTO: 0.03 THOUSAND/UL (ref 0–0.2)
IMM GRANULOCYTES NFR BLD AUTO: 0 % (ref 0–2)
LYMPHOCYTES # BLD AUTO: 1.31 THOUSANDS/ΜL (ref 0.6–4.47)
LYMPHOCYTES NFR BLD AUTO: 17 % (ref 14–44)
MCH RBC QN AUTO: 30.8 PG (ref 26.8–34.3)
MCHC RBC AUTO-ENTMCNC: 32.5 G/DL (ref 31.4–37.4)
MCV RBC AUTO: 95 FL (ref 82–98)
MONOCYTES # BLD AUTO: 0.45 THOUSAND/ΜL (ref 0.17–1.22)
MONOCYTES NFR BLD AUTO: 6 % (ref 4–12)
NEUTROPHILS # BLD AUTO: 5.98 THOUSANDS/ΜL (ref 1.85–7.62)
NEUTS SEG NFR BLD AUTO: 75 % (ref 43–75)
NRBC BLD AUTO-RTO: 0 /100 WBCS
PLATELET # BLD AUTO: 267 THOUSANDS/UL (ref 149–390)
PMV BLD AUTO: 10.4 FL (ref 8.9–12.7)
POTASSIUM SERPL-SCNC: 3.9 MMOL/L (ref 3.5–5.3)
RBC # BLD AUTO: 5.26 MILLION/UL (ref 3.81–5.12)
SODIUM SERPL-SCNC: 143 MMOL/L (ref 136–145)
TSH SERPL DL<=0.05 MIU/L-ACNC: 2.53 UIU/ML (ref 0.36–3.74)
WBC # BLD AUTO: 7.89 THOUSAND/UL (ref 4.31–10.16)

## 2022-02-05 PROCEDURE — 72125 CT NECK SPINE W/O DYE: CPT

## 2022-02-05 PROCEDURE — 84484 ASSAY OF TROPONIN QUANT: CPT | Performed by: PHYSICIAN ASSISTANT

## 2022-02-05 PROCEDURE — 99213 OFFICE O/P EST LOW 20 MIN: CPT | Performed by: NURSE PRACTITIONER

## 2022-02-05 PROCEDURE — 80048 BASIC METABOLIC PNL TOTAL CA: CPT | Performed by: PHYSICIAN ASSISTANT

## 2022-02-05 PROCEDURE — 12001 RPR S/N/AX/GEN/TRNK 2.5CM/<: CPT | Performed by: PHYSICIAN ASSISTANT

## 2022-02-05 PROCEDURE — G1004 CDSM NDSC: HCPCS

## 2022-02-05 PROCEDURE — 85025 COMPLETE CBC W/AUTO DIFF WBC: CPT | Performed by: PHYSICIAN ASSISTANT

## 2022-02-05 PROCEDURE — 99285 EMERGENCY DEPT VISIT HI MDM: CPT

## 2022-02-05 PROCEDURE — 70450 CT HEAD/BRAIN W/O DYE: CPT

## 2022-02-05 PROCEDURE — 99285 EMERGENCY DEPT VISIT HI MDM: CPT | Performed by: PHYSICIAN ASSISTANT

## 2022-02-05 PROCEDURE — 36415 COLL VENOUS BLD VENIPUNCTURE: CPT | Performed by: PHYSICIAN ASSISTANT

## 2022-02-05 PROCEDURE — 84443 ASSAY THYROID STIM HORMONE: CPT | Performed by: PHYSICIAN ASSISTANT

## 2022-02-05 PROCEDURE — 82948 REAGENT STRIP/BLOOD GLUCOSE: CPT

## 2022-02-05 PROCEDURE — 93005 ELECTROCARDIOGRAM TRACING: CPT | Performed by: NURSE PRACTITIONER

## 2022-02-05 RX ORDER — ONDANSETRON 2 MG/ML
1 INJECTION INTRAMUSCULAR; INTRAVENOUS ONCE
Status: COMPLETED | OUTPATIENT
Start: 2022-02-05 | End: 2022-02-05

## 2022-02-05 RX ORDER — LIDOCAINE HYDROCHLORIDE 20 MG/ML
JELLY TOPICAL ONCE
Status: COMPLETED | OUTPATIENT
Start: 2022-02-05 | End: 2022-02-05

## 2022-02-05 RX ADMIN — LIDOCAINE HYDROCHLORIDE: 20 JELLY TOPICAL at 14:24

## 2022-02-05 NOTE — DISCHARGE INSTRUCTIONS
Please refer to the attached information for strict return instructions  If symptoms worsen or new symptoms develop please return to the ER  Please follow up for staple removal in about 7-8 days at urgent care

## 2022-02-05 NOTE — PROGRESS NOTES
NAME: Nirmala Casey is a 55 y o  female  : 1965    MRN: [id number]    /57   Pulse 60   Temp (!) 97 3 °F (36 3 °C)   Wt 90 7 kg (200 lb)   LMP 2018   SpO2 99%   BMI 36 00 kg/m²     Assessment and Plan   Fall, initial encounter [W19  XXXA]  1  Fall, initial encounter  Transfer to other facility   2  Laceration of other part of head without foreign body, initial encounter  Transfer to other facility   3  Vasovagal syncope  Transfer to other facility       Dhaval Dhaliwal was seen today for head injury  Diagnoses and all orders for this visit:    Fall, initial encounter  -     Transfer to other facility    Laceration of other part of head without foreign body, initial encounter  -     Transfer to other facility    Vasovagal syncope  -     Transfer to other facility      Vital signs:   1248 pt collapsed on the floor at side of her   Pt had positive LOC, fell forward from sitting position and hitting her head on the exam table face first  She was turned onto her back to have clear airway and placed in color, Pt confused when coming to  Pt put in a c-collar, wound on forehead cleansed and pt monitored, EMS called  1250 - 102/59, 64, 98% on RA pt laying on her back on exam floor awaiting for EMS to come  Pt slightly confused at time of injury  1252 pt is coming around, AAOx3, aware that she passed out after me explaining to her and her  of his recent injuries to the right wrist, R wrist fracture  [phone] EMS in route, /59, HR 68, Sats 99  1304 EMS here and has pt on stretchers, AAOx3, Pt going to the ER for further evaluation due to injury  Patient Instructions   Patient Instructions     Pt in route to the ER via EMS due to syncopal episode and obtaining laceration to the left side of head    Syncope   WHAT YOU NEED TO KNOW:   Syncope is also called fainting or passing out   Syncope is a sudden, temporary loss of consciousness, followed by a fall from a standing or sitting position  Syncope ranges from not serious to a sign of a more serious condition that needs to be treated  You can control some health conditions that cause syncope  Your healthcare providers can help you create a plan to manage syncope and prevent episodes  DISCHARGE INSTRUCTIONS:   Seek care immediately if:   · You are bleeding because you hit your head when you fainted  · You suddenly have double vision, difficulty speaking, numbness, and cannot move your arms or legs  · You have chest pain and trouble breathing  · You vomit blood or material that looks like coffee grounds  · You see blood in your bowel movement  Contact your healthcare provider if:   · You have new or worsening symptoms  · You have another syncope episode  · You have a headache, fast heartbeat, or feel too dizzy to stand up  · You have questions or concerns about your condition or care  Medicines:   · Medicines  may be needed to help your heart pump strongly and regularly  Your healthcare provider may also make changes to any medicines that are causing syncope  · Take your medicine as directed  Contact your healthcare provider if you think your medicine is not helping or if you have side effects  Tell him or her if you are allergic to any medicine  Keep a list of the medicines, vitamins, and herbs you take  Include the amounts, and when and why you take them  Bring the list or the pill bottles to follow-up visits  Carry your medicine list with you in case of an emergency  Follow up with your doctor as directed:  Write down your questions so you remember to ask them during your visits  Manage syncope:   · Keep a record of your syncope episodes  Include your symptoms and your activity before and after the episode  The record can help your healthcare provider find the cause of your syncope and help you manage episodes  · Sit or lie down when needed    This includes when you feel dizzy, your throat is getting tight, and your vision changes  Raise your legs above the level of your heart  · Take slow, deep breaths if you start to breathe faster with anxiety or fear  This can help decrease dizziness and the feeling that you might faint  · Check your blood pressure often  This is important if you take medicine to lower your blood pressure  Check your blood pressure when you are lying down and when you are standing  Ask how often to check during the day  Keep a record of your blood pressure numbers  Your healthcare provider may use the record to help plan your treatment  Prevent a syncope episode:   · Move slowly and let yourself get used to one position before you move to another position  This is very important when you change from a lying or sitting position to a standing position  Take some deep breaths before you stand up from a lying position  Stand up slowly  Sudden movements may cause a fainting spell  Sit on the side of the bed or couch for a few minutes before you stand up  If you are on bedrest, try to be upright for about 2 hours each day, or as directed  Do not lock your legs if you are standing for a long period of time  Move your legs and bend your knees to keep blood flowing  · Follow your healthcare provider's recommendations  Your provider may  recommend that you drink more liquids to prevent dehydration  You may also need to have more salt to keep your blood pressure from dropping too low and causing syncope  Your provider will tell you how much liquid and sodium to have each day  He or she will also tell you how much physical activity is safe for you  This will depend on what is causing your syncope  · Watch for signs of low blood sugar  These include hunger, nervousness, sweating, and fast or fluttery heartbeats  Talk with your healthcare provider about ways to keep your blood sugar level steady  · Do not strain if you are constipated    You may faint if you strain to have a bowel movement  Walking is the best way to get your bowels moving  Eat foods high in fiber to make it easier to have a bowel movement  Good examples are high-fiber cereals, beans, vegetables, and whole-grain breads  Prune juice may help make bowel movements softer  · Be careful in hot weather  Heat can cause a syncope episode  Limit activity done outside on hot days  Physical activity in hot weather can lead to dehydration  This can cause an episode  © Copyright [employer] 2021 Information is for End User's use only and may not be sold, redistributed or otherwise used for commercial purposes  All illustrations and images included in CareNotes® are the copyrighted property of [employer] M [employer]  or Judith Dooley   The above information is an  only  It is not intended as medical advice for individual conditions or treatments  Talk to your doctor, nurse or pharmacist before following any medical regimen to see if it is safe and effective for you  Proceed to the nearest ER if symptoms worsen, Follow up with your PCP  Continue to social distance, wash your hands, and wear your masks  Please continue to follow the CDC  gov guidelines daily for they are subject to change on COVID-19    Chief Complaint     Chief Complaint   Patient presents with    Head Injury     Patient was sitting in a chair and was loomking at her husbands xray and fell forward striking her the front of her head on the floor  Patietn became lightheaded prior to falling  Patient rolled over and groggy  She states this has happened before          History of Present Illness     Patient is a 41-year-old female who is here today with her   Her  was a patient in the office and was being splinted and patient passed out  Patient went for striking her head on the stretcher bed causing a laceration to the left side of her head and brief loss of consciousness   Pt went face down and has a hx of passingout with the sight of blood  She is bleeding from the left side of forehead with a 2 5cm laceration which is controlled  She was put onto her back with a c collar on until EMS arrived  EMS was called immediately  Denies taking any blood thinners      Review of Systems   Review of Systems   Constitutional: Negative for activity change  Musculoskeletal: Positive for neck pain  Skin: Positive for wound (laceration to left side of forehead)  Laceration to the left side of the head     Neurological: Positive for dizziness, syncope and headaches  Negative for tremors, seizures, speech difficulty and weakness  Psychiatric/Behavioral: Negative for agitation           Current Medications       Current Outpatient Medications:     calcium citrate-vitamin D (CITRACAL+D) 315-200 MG-UNIT per tablet, Take 1 tablet by mouth 2 (two) times a day, Disp: , Rfl:     cholecalciferol (VITAMIN D3) 25 mcg (1,000 units) tablet, 1,000 Units daily, Disp: , Rfl:     levothyroxine 50 mcg tablet, Take 1 tablet (50 mcg total) by mouth daily, Disp: 90 tablet, Rfl: 0    Multiple Vitamin (MULTIVITAMIN) capsule, Take 1 capsule by mouth daily, Disp: , Rfl:     Omega-3 Fatty Acids (FISH OIL) 1,000 mg, Take 1,000 mg by mouth daily, Disp: , Rfl:     Red Yeast Rice 600 MG TABS, 600 mg 2 (two) times a day, Disp: , Rfl:     cyclobenzaprine (FLEXERIL) 5 mg tablet, Take 1 tablet (5 mg total) by mouth 3 (three) times a day as needed for muscle spasms (Patient not taking: Reported on 2/5/2022 ), Disp: 90 tablet, Rfl: 0    Sodium Sulfate-Mag Sulfate-KCl (Sutab) 3738-775-133 MG TABS, Take 1 kit by mouth see administration instructions, Disp: 24 tablet, Rfl: 0    terbinafine (LamISIL) 250 mg tablet, 250 mg daily, Disp: , Rfl:     Current Allergies     Allergies as of 02/05/2022 - Reviewed 02/05/2022   Allergen Reaction Noted    Codeine GI Intolerance 12/08/2015    Tramadol GI Intolerance 08/01/2013              Past Medical History:   Diagnosis Date    Allergic     Allergic rhinitis     Last Assessed:2012    Disease of thyroid gland     Hiatal hernia     noted on GI xray   Myxedema     Last Assessed:2013       Past Surgical History:   Procedure Laterality Date     SECTION      CHOLECYSTECTOMY      COLONOSCOPY  2019    Dr Jonathan Cleveland, [facility]  Tubular adenoma polyp 15 mm removed from distal descending colon, hyperplastic polyp removed from transverse colon, random biopsies negative for colitis   EGD  2019    Dr Jonathan Cleveland, [facility]  Mild antral gastritis with biopsies negative, esophageal biopsy negative  EGD in  by Dr Amalia Hill, normal with fundic gland polyp  Family History   Problem Relation Age of Onset    Lupus Mother     Heart defect Mother     Kidney disease Mother     Arthritis Mother     Rheum arthritis Mother     Lung cancer Father     Cancer Father     Pulmonary embolism Father          Medications have been verified  The following portions of the patient's history were reviewed and updated as appropriate: allergies, current medications, past family history, past medical history, past social history, past surgical history and problem list     Objective   /57   Pulse 60   Temp (!) 97 3 °F (36 3 °C)   Wt 90 7 kg (200 lb)   LMP 2018   SpO2 99%   BMI 36 00 kg/m²      Physical Exam     Physical Exam  Constitutional:       General: She is awake  Appearance: She is ill-appearing  HENT:      Head: Laceration present  Comments: Traumatic injury to the left side of the head, denies jaw pain, c collar placed on pts neck immediately     Mouth/Throat:      Lips: Pink  Mouth: Mucous membranes are moist    Eyes:      General: Lids are normal       Pupils: Pupils are equal, round, and reactive to light  Neck:      Thyroid: No thyroid mass  Comments: Syncopal episode causing pt to pass out, fall forward and passing out    She has nausea present  Cardiovascular:      Rate and Rhythm: Normal rate and regular rhythm  Pulmonary:      Effort: Pulmonary effort is normal    Musculoskeletal:      Cervical back: Signs of trauma present  Muscular tenderness present  Skin:     General: Skin is warm  Findings: Ecchymosis, signs of injury and wound present  Neurological:      Mental Status: She is alert and oriented to person, place, and time  GCS: GCS eye subscore is 4  GCS verbal subscore is 5  GCS motor subscore is 6  Sensory: Sensation is intact  Motor: Motor function is intact  Coordination: Coordination is intact  Comments: After pt came to after a brief    Psychiatric:         Behavior: Behavior is cooperative  Note: Portions of this record may have been created with voice recognition software  Occasional wrong word or "sound a like" substitutions may have occurred due to the inherent limitations of voice recognition software  Please read the chart carefully and recognize, using context, where substitutions have occurred  ROBSON Alejo

## 2022-02-05 NOTE — PATIENT INSTRUCTIONS
Pt in route to the ER via EMS due to syncopal episode and obtaining laceration to the left side of head    Syncope   WHAT YOU NEED TO KNOW:   Syncope is also called fainting or passing out  Syncope is a sudden, temporary loss of consciousness, followed by a fall from a standing or sitting position  Syncope ranges from not serious to a sign of a more serious condition that needs to be treated  You can control some health conditions that cause syncope  Your healthcare providers can help you create a plan to manage syncope and prevent episodes  DISCHARGE INSTRUCTIONS:   Seek care immediately if:   · You are bleeding because you hit your head when you fainted  · You suddenly have double vision, difficulty speaking, numbness, and cannot move your arms or legs  · You have chest pain and trouble breathing  · You vomit blood or material that looks like coffee grounds  · You see blood in your bowel movement  Contact your healthcare provider if:   · You have new or worsening symptoms  · You have another syncope episode  · You have a headache, fast heartbeat, or feel too dizzy to stand up  · You have questions or concerns about your condition or care  Medicines:   · Medicines  may be needed to help your heart pump strongly and regularly  Your healthcare provider may also make changes to any medicines that are causing syncope  · Take your medicine as directed  Contact your healthcare provider if you think your medicine is not helping or if you have side effects  Tell him or her if you are allergic to any medicine  Keep a list of the medicines, vitamins, and herbs you take  Include the amounts, and when and why you take them  Bring the list or the pill bottles to follow-up visits  Carry your medicine list with you in case of an emergency  Follow up with your doctor as directed:  Write down your questions so you remember to ask them during your visits     Manage syncope:   · Keep a record of your syncope episodes  Include your symptoms and your activity before and after the episode  The record can help your healthcare provider find the cause of your syncope and help you manage episodes  · Sit or lie down when needed  This includes when you feel dizzy, your throat is getting tight, and your vision changes  Raise your legs above the level of your heart  · Take slow, deep breaths if you start to breathe faster with anxiety or fear  This can help decrease dizziness and the feeling that you might faint  · Check your blood pressure often  This is important if you take medicine to lower your blood pressure  Check your blood pressure when you are lying down and when you are standing  Ask how often to check during the day  Keep a record of your blood pressure numbers  Your healthcare provider may use the record to help plan your treatment  Prevent a syncope episode:   · Move slowly and let yourself get used to one position before you move to another position  This is very important when you change from a lying or sitting position to a standing position  Take some deep breaths before you stand up from a lying position  Stand up slowly  Sudden movements may cause a fainting spell  Sit on the side of the bed or couch for a few minutes before you stand up  If you are on bedrest, try to be upright for about 2 hours each day, or as directed  Do not lock your legs if you are standing for a long period of time  Move your legs and bend your knees to keep blood flowing  · Follow your healthcare provider's recommendations  Your provider may  recommend that you drink more liquids to prevent dehydration  You may also need to have more salt to keep your blood pressure from dropping too low and causing syncope  Your provider will tell you how much liquid and sodium to have each day  He or she will also tell you how much physical activity is safe for you   This will depend on what is causing your syncope  · Watch for signs of low blood sugar  These include hunger, nervousness, sweating, and fast or fluttery heartbeats  Talk with your healthcare provider about ways to keep your blood sugar level steady  · Do not strain if you are constipated  You may faint if you strain to have a bowel movement  Walking is the best way to get your bowels moving  Eat foods high in fiber to make it easier to have a bowel movement  Good examples are high-fiber cereals, beans, vegetables, and whole-grain breads  Prune juice may help make bowel movements softer  · Be careful in hot weather  Heat can cause a syncope episode  Limit activity done outside on hot days  Physical activity in hot weather can lead to dehydration  This can cause an episode  © Copyright [employer] 2021 Information is for End User's use only and may not be sold, redistributed or otherwise used for commercial purposes  All illustrations and images included in CareNotes® are the copyrighted property of [employer] Inc  or [facility] Rajwinder Dooley   The above information is an  only  It is not intended as medical advice for individual conditions or treatments  Talk to your doctor, nurse or pharmacist before following any medical regimen to see if it is safe and effective for you

## 2022-02-05 NOTE — ED PROVIDER NOTES
History  Chief Complaint   Patient presents with    Syncope     was with  at [facility] and syncopized during procedure  +LOC, neg thinners, GCS15, small lac to left forehead     Dhaval Dhaliwal is a 54 yo F presenting via EMS for evaluation after passing out and striking her head just prior to arrival  She was reportedly accompanying her  to urgent care and was discussing her 's fracture with the staff  She reports talking about blood and injuries often makes her "nauseous and dizzy"  She reports she began to feel lightheaded and nauseous at that time, and subsequently fell from standing striking her L forehead/scalp on the exam bed  She did pass out for seconds to less than a minute  She was initially slightly confused but quickly became oriented  She report wound to L scalp just behind hairline, bleeding controlled  Tetanus UTD  She does note generalized headache  Denies neck pain/stiffness  No associated chest pain, dyspnea, palpitations  No extremity pain  No new numbness, tingling, or weakness  No thinners/aspirin  History provided by:  Patient   used: No        Prior to Admission Medications   Prescriptions Last Dose Informant Patient Reported? Taking?    Multiple Vitamin (MULTIVITAMIN) capsule   Yes No   Sig: Take 1 capsule by mouth daily   Omega-3 Fatty Acids (FISH OIL) 1,000 mg   Yes No   Sig: Take 1,000 mg by mouth daily   Red Yeast Rice 600 MG TABS   Yes No   Si mg 2 (two) times a day   Sodium Sulfate-Mag Sulfate-KCl (Sutab) 6751-796-878 MG TABS   No No   Sig: Take 1 kit by mouth see administration instructions   calcium citrate-vitamin D (CITRACAL+D) 315-200 MG-UNIT per tablet   Yes No   Sig: Take 1 tablet by mouth 2 (two) times a day   cholecalciferol (VITAMIN D3) 25 mcg (1,000 units) tablet   Yes No   Si,000 Units daily   cyclobenzaprine (FLEXERIL) 5 mg tablet Not Taking at Unknown time  No No   Sig: Take 1 tablet (5 mg total) by mouth 3 (three) times a day as needed for muscle spasms   Patient not taking: Reported on 2022    levothyroxine 50 mcg tablet   No No   Sig: Take 1 tablet (50 mcg total) by mouth daily   terbinafine (LamISIL) 250 mg tablet   Yes No   Si mg daily      Facility-Administered Medications: None       Past Medical History:   Diagnosis Date    Allergic     Allergic rhinitis     Last Assessed:2012    Disease of thyroid gland     Hiatal hernia     noted on GI xray   Myxedema     Last Assessed:2013       Past Surgical History:   Procedure Laterality Date     SECTION      CHOLECYSTECTOMY      COLONOSCOPY  2019    Dr Flex Boone, [facility]  Tubular adenoma polyp 15 mm removed from distal descending colon, hyperplastic polyp removed from transverse colon, random biopsies negative for colitis   EGD  2019    Dr Flex Boone, [facility]  Mild antral gastritis with biopsies negative, esophageal biopsy negative  EGD in  by Dr Darylene Hugger, normal with fundic gland polyp  Family History   Problem Relation Age of Onset    Lupus Mother     Heart defect Mother     Kidney disease Mother     Arthritis Mother     Rheum arthritis Mother     Lung cancer Father     Cancer Father     Pulmonary embolism Father      I have reviewed and agree with the history as documented  E-Cigarette/Vaping    E-Cigarette Use Never User      E-Cigarette/Vaping Substances     Social History     Tobacco Use    Smoking status: Never Smoker    Smokeless tobacco: Never Used   Vaping Use    Vaping Use: Never used   Substance Use Topics    Alcohol use: Yes     Alcohol/week: 0 0 standard drinks     Comment: occasional    Drug use: No       Review of Systems   Constitutional: Negative for chills and fever  HENT: Negative for congestion, rhinorrhea and sore throat  Eyes: Negative for pain and visual disturbance  Respiratory: Negative for cough, shortness of breath and wheezing  Cardiovascular: Negative for chest pain and palpitations  Gastrointestinal: Negative for abdominal pain, nausea and vomiting  Genitourinary: Negative for dysuria, frequency and urgency  Musculoskeletal: Negative for back pain, neck pain and neck stiffness  Skin: Positive for wound  Negative for rash  Neurological: Positive for syncope, light-headedness and headaches  Negative for dizziness, weakness and numbness  Physical Exam  Physical Exam  Constitutional:       General: She is not in acute distress  Appearance: She is well-developed  She is not diaphoretic  HENT:      Head: Normocephalic and atraumatic  Right Ear: External ear normal       Left Ear: External ear normal    Eyes:      Conjunctiva/sclera: Conjunctivae normal       Pupils: Pupils are equal, round, and reactive to light  Cardiovascular:      Rate and Rhythm: Normal rate and regular rhythm  Heart sounds: Normal heart sounds  No murmur heard  No friction rub  No gallop  Pulmonary:      Effort: Pulmonary effort is normal  No respiratory distress  Breath sounds: Normal breath sounds  No wheezing  Abdominal:      General: There is no distension  Palpations: Abdomen is soft  Tenderness: There is no abdominal tenderness  Musculoskeletal:      Cervical back: Normal range of motion and neck supple  Comments: No midline C/T/L TTP  No chest wall TTP  No pelvis bony TTP/instability  Normal ROM extremities x4 without pain or deformity  Lymphadenopathy:      Cervical: No cervical adenopathy  Skin:     General: Skin is warm and dry  Capillary Refill: Capillary refill takes less than 2 seconds  Findings: No erythema or rash  Neurological:      Mental Status: She is alert and oriented to person, place, and time  Motor: No abnormal muscle tone  Coordination: Coordination normal    Psychiatric:         Behavior: Behavior normal          Thought Content:  Thought content normal          Judgment: Judgment normal          Vital Signs  ED Triage Vitals [02/05/22 1358]   Temperature Pulse Respirations Blood Pressure SpO2   97 7 °F (36 5 °C) 77 18 142/78 100 %      Temp Source Heart Rate Source Patient Position - Orthostatic VS BP Location FiO2 (%)   Oral Monitor Lying Right arm --      Pain Score       2           Vitals:    02/05/22 1358 02/05/22 1500   BP: 142/78 132/67   Pulse: 77 72   Patient Position - Orthostatic VS: Lying Lying         Visual Acuity      ED Medications  Medications   ondansetron (FOR EMS ONLY) (ZOFRAN) 4 mg/2 mL injection 4 mg (0 mg Does not apply Given to EMS 2/5/22 1346)   lidocaine (XYLOCAINE) 2 % topical gel ( Topical Given 2/5/22 1424)       Diagnostic Studies  Results Reviewed     Procedure Component Value Units Date/Time    Basic metabolic panel [[id number]]  (Abnormal) Collected: 02/05/22 1416    Lab Status: Final result Specimen: Blood from Hand, Left Updated: 02/05/22 1452     Sodium 143 mmol/L      Potassium 3 9 mmol/L      Chloride 109 mmol/L      CO2 24 mmol/L      ANION GAP 10 mmol/L      BUN 15 mg/dL      Creatinine 0 93 mg/dL      Glucose 111 mg/dL      Calcium 8 5 mg/dL      eGFR 69 ml/min/1 73sq m     Narrative:      [place] guidelines for Chronic Kidney Disease (CKD):     Stage 1 with normal or high GFR (GFR > 90 mL/min/1 73 square meters)    Stage 2 Mild CKD (GFR = 60-89 mL/min/1 73 square meters)    Stage 3A Moderate CKD (GFR = 45-59 mL/min/1 73 square meters)    Stage 3B Moderate CKD (GFR = 30-44 mL/min/1 73 square meters)    Stage 4 Severe CKD (GFR = 15-29 mL/min/1 73 square meters)    Stage 5 End Stage CKD (GFR <15 mL/min/1 73 square meters)  Note: GFR calculation is accurate only with a steady state creatinine    TSH, 3rd generation with Free T4 reflex [[id number]]  (Normal) Collected: 02/05/22 1416    Lab Status: Final result Specimen: Blood from Hand, Left Updated: 02/05/22 1452     TSH 3RD GENERATON 2 532 uIU/mL     Narrative:      Patients undergoing fluorescein dye angiography may retain small amounts of fluorescein in the body for 48-72 hours post procedure  Samples containing fluorescein can produce falsely depressed TSH values  If the patient had this procedure,a specimen should be resubmitted post fluorescein clearance  HS Troponin 0hr (reflex protocol) [[id number]]  (Normal) Collected: 02/05/22 1416    Lab Status: Final result Specimen: Blood from Hand, Left Updated: 02/05/22 1449     hs TnI 0hr 2 ng/L     CBC and differential [[id number]]  (Abnormal) Collected: 02/05/22 1416    Lab Status: Final result Specimen: Blood from Hand, Left Updated: 02/05/22 1421     WBC 7 89 Thousand/uL      RBC 5 26 Million/uL      Hemoglobin 16 2 g/dL      Hematocrit 49 9 %      MCV 95 fL      MCH 30 8 pg      MCHC 32 5 g/dL      RDW 13 3 %      MPV 10 4 fL      Platelets 882 Thousands/uL      nRBC 0 /100 WBCs      Neutrophils Relative 75 %      Immat GRANS % 0 %      Lymphocytes Relative 17 %      Monocytes Relative 6 %      Eosinophils Relative 1 %      Basophils Relative 1 %      Neutrophils Absolute 5 98 Thousands/µL      Immature Grans Absolute 0 03 Thousand/uL      Lymphocytes Absolute 1 31 Thousands/µL      Monocytes Absolute 0 45 Thousand/µL      Eosinophils Absolute 0 08 Thousand/µL      Basophils Absolute 0 04 Thousands/µL     Fingerstick Glucose (POCT) [[id number]]  (Normal) Collected: 02/05/22 1351    Lab Status: Final result Updated: 02/05/22 1352     POC Glucose 121 mg/dl                  CT head without contrast   Final Result by Jose Pierce MD (02/05 1442)      No acute intracranial abnormality  Scattered sinus mucosal thickening with air-fluid level in the right maxillary sinus consistent with acute sinusitis  Left frontal scalp swelling without underlying fracture  Workstation performed: [id number]         CT spine cervical without contrast   Final Result by Jose Pierce MD (02/05 1444)      No cervical spine fracture or traumatic malalignment  Workstation performed: [id number]                    Procedures  Laceration repair    Date/Time: 2/5/2022 2:30 PM  Performed by: Sarah Segura PA-C  Authorized by: Sarah Segura PA-C   Consent: Verbal consent obtained  Risks and benefits: risks, benefits and alternatives were discussed  Consent given by: patient  Patient understanding: patient states understanding of the procedure being performed  Patient consent: the patient's understanding of the procedure matches consent given  Required items: required blood products, implants, devices, and special equipment available  Patient identity confirmed: verbally with patient  Time out: Immediately prior to procedure a "time out" was called to verify the correct patient, procedure, equipment, support staff and site/side marked as required  Body area: head/neck  Location details: scalp  Laceration length: 2 5 cm  Foreign bodies: no foreign bodies  Tendon involvement: none  Nerve involvement: none  Anesthesia method: topoical lidocaine  Anesthesia:  Local Anesthetic: topical anesthetic    Sedation:  Patient sedated: no        Procedure Details:  Preparation: Patient was prepped and draped in the usual sterile fashion  Irrigation solution: saline  Irrigation method: syringe  Amount of cleaning: standard  Debridement: none  Degree of undermining: none  Skin closure: staples  Number of sutures: 2 staples    Approximation: close  Approximation difficulty: simple  Patient tolerance: patient tolerated the procedure well with no immediate complications    ECG 12 Lead Documentation Only    Date/Time: 2/5/2022 1:50 PM  Performed by: Sarah Segura PA-C  Authorized by: Sarah Segura PA-C     Indications / Diagnosis:  Syncope  ECG reviewed by me, the ED Provider: yes    Patient location:  ED  Previous ECG:     Previous ECG:  Unavailable    Comparison to cardiac monitor: Yes    Interpretation:     Interpretation: abnormal    Rate:     ECG rate:  68    ECG rate assessment: normal    Rhythm:     Rhythm: sinus rhythm    Ectopy:     Ectopy: none    QRS:     QRS axis:  Right    QRS intervals:  Normal  Conduction:     Conduction: abnormal      Abnormal conduction: incomplete RBBB    ST segments:     ST segments:  Normal  T waves:     T waves: normal               ED Course  ED Course as of 02/05/22 1557   Sat Feb 05, 2022   1510 Patient remains asymptomatic  Scalp laceration repaired with 2 staples  Symptoms appear to be secondary to vasovagal episode with clear trigger  Stable for discharge  SBIRT 22yo+      Most Recent Value   SBIRT (22 yo +)    In order to provide better care to our patients, we are screening all of our patients for alcohol and drug use  Would it be okay to ask you these screening questions? No Filed at: 02/05/2022 1403                    [facility]  Number of Diagnoses or Management Options  Laceration of scalp, initial encounter  Vasovagal episode  Diagnosis management comments: Fall, syncope with preceding nausea and lightheadedness shortly prior to arrival  Symptoms appear to directly correlate with vasovagal episode related to discussion of her spouse's injury  Does note history of similar due to sight of blood/injuries  Exam is benign at this time, patient asymptomatic with exception of headache and scalp laceration  Will check EKG, basic labs, check CT head/c-spine for acute traumatic fracture/bleed  Plan for wound cleansing and closure with staples  Disposition pending ED workup         Amount and/or Complexity of Data Reviewed  Clinical lab tests: reviewed and ordered  Tests in the radiology section of CPT®: ordered and reviewed    Patient Progress  Patient progress: stable      Disposition  Final diagnoses:   Vasovagal episode   Laceration of scalp, initial encounter     Time reflects when diagnosis was documented in both MDM as applicable and the Disposition within this note     Time User Action Codes Description Comment    2/5/2022  3:23 PM Lynne Adán Add [R55] Vasovagal episode     2/5/2022  3:23 PM Lynne Adán Add [S01 01XA] Laceration of scalp, initial encounter       ED Disposition     ED Disposition Condition Date/Time Comment    Discharge Stable Sat Feb 5, 2022  3:23 PM [place] discharge to home/self care  Follow-up Information     Follow up With Specialties Details Why Contact Info Additional [address] [facility] Emergency Department Emergency Medicine  If symptoms worsen [facility] [id number]  [address] [facility], [address] , [address], [place], [address] [facility]  For staple removal in 7 days  [address], [address] Now [address], [phone]     Via the [address] (North/South) Take [address] toward [address]  Take the [facility] [address]  Keep right and follow signs for [address]/ [place]  Merge onto [address] [place] [address]  In a half mile, take the exit for [address] toward [facility]  In 0 7 miles take the [facility] [employer] [address]  Merge onto [facility]  In 500 feet, turn left on [address] and drive 0 3 miles  [address] will be on your left  [address] ([address]) [address] toward [place]  Take the [facility] [employer]  Merge onto [facility]  In 500 feet, turn left on [address] and [address] 0 3 miles  [address] be on your left  [address] [facility]  In 0 7 miles take the [facility] [employer]  Merge onto [facility]  In 500 feet, turn left on [address] and drive 0 3 miles  [address] will be on your left            Discharge Medication List as of 2/5/2022  3:25 PM CONTINUE these medications which have NOT CHANGED    Details   calcium citrate-vitamin D (CITRACAL+D) 315-200 MG-UNIT per tablet Take 1 tablet by mouth 2 (two) times a day, Historical Med      cholecalciferol (VITAMIN D3) 25 mcg (1,000 units) tablet 1,000 Units daily, Historical Med      cyclobenzaprine (FLEXERIL) 5 mg tablet Take 1 tablet (5 mg total) by mouth 3 (three) times a day as needed for muscle spasms, Starting Wed 5/12/2021, Normal      levothyroxine 50 mcg tablet Take 1 tablet (50 mcg total) by mouth daily, Starting Fri 10/15/2021, Normal      Multiple Vitamin (MULTIVITAMIN) capsule Take 1 capsule by mouth daily, Historical Med      Omega-3 Fatty Acids (FISH OIL) 1,000 mg Take 1,000 mg by mouth daily, Historical Med      Red Yeast Rice 600 MG TABS 600 mg 2 (two) times a day, Starting Fri 2/19/2021, Historical Med      Sodium Sulfate-Mag Sulfate-KCl (Sutab) 9221-034-608 MG TABS Take 1 kit by mouth see administration instructions, Starting Fri 5/14/2021, Normal      terbinafine (LamISIL) 250 mg tablet 250 mg daily, Starting u 5/13/2021, Historical Med             No discharge procedures on file      PDMP Review     None          ED Provider  Electronically Signed by           Nikolai Moran PA-C  02/05/22 [address] [place] [address] Arturo Moctezuma PA-C  02/05/22 9683

## 2022-02-06 LAB
ATRIAL RATE: 68 BPM
P AXIS: 56 DEGREES
PR INTERVAL: 168 MS
QRS AXIS: 91 DEGREES
QRSD INTERVAL: 96 MS
QT INTERVAL: 452 MS
QTC INTERVAL: 480 MS
T WAVE AXIS: 12 DEGREES
VENTRICULAR RATE: 68 BPM

## 2022-02-06 PROCEDURE — 93010 ELECTROCARDIOGRAM REPORT: CPT | Performed by: INTERNAL MEDICINE

## 2022-02-11 ENCOUNTER — OFFICE VISIT (OUTPATIENT)
Dept: FAMILY MEDICINE CLINIC | Facility: CLINIC | Age: 57
End: 2022-02-11
Payer: COMMERCIAL

## 2022-02-11 VITALS
HEART RATE: 99 BPM | HEIGHT: 62 IN | BODY MASS INDEX: 37.54 KG/M2 | SYSTOLIC BLOOD PRESSURE: 130 MMHG | WEIGHT: 204 LBS | DIASTOLIC BLOOD PRESSURE: 80 MMHG | TEMPERATURE: 98.2 F | RESPIRATION RATE: 16 BRPM | OXYGEN SATURATION: 98 %

## 2022-02-11 DIAGNOSIS — S01.81XD LACERATION OF FOREHEAD, SUBSEQUENT ENCOUNTER: Primary | ICD-10-CM

## 2022-02-11 DIAGNOSIS — T14.8XXA STAPLED SKIN WOUND: ICD-10-CM

## 2022-02-11 PROBLEM — S01.81XA LACERATION OF FOREHEAD: Status: ACTIVE | Noted: 2022-02-11

## 2022-02-11 PROCEDURE — 99214 OFFICE O/P EST MOD 30 MIN: CPT | Performed by: FAMILY MEDICINE

## 2022-02-11 NOTE — ASSESSMENT & PLAN NOTE
Pt had staples removed with extreme difficulty due to deformity of staples being twisted on itself and embedded in skin; was able to remove without significant complications but advised resuturing area due to this; pt refused sutures; steri strips were applied and advised on wound care with topical antibiotic; advised against showering for 24 hours; will recheck in 1 week due to some mid ck

## 2022-02-18 ENCOUNTER — OFFICE VISIT (OUTPATIENT)
Dept: FAMILY MEDICINE CLINIC | Facility: CLINIC | Age: 57
End: 2022-02-18
Payer: COMMERCIAL

## 2022-02-18 VITALS
OXYGEN SATURATION: 94 % | TEMPERATURE: 97.5 F | WEIGHT: 204 LBS | HEART RATE: 81 BPM | HEIGHT: 62 IN | BODY MASS INDEX: 37.54 KG/M2 | DIASTOLIC BLOOD PRESSURE: 80 MMHG | SYSTOLIC BLOOD PRESSURE: 122 MMHG

## 2022-02-18 DIAGNOSIS — S11.91XD LACERATION OF HEAD AND NECK, SUBSEQUENT ENCOUNTER: Primary | ICD-10-CM

## 2022-02-18 DIAGNOSIS — S01.91XD LACERATION OF HEAD AND NECK, SUBSEQUENT ENCOUNTER: Primary | ICD-10-CM

## 2022-02-18 DIAGNOSIS — R55 VASOVAGAL EPISODE: ICD-10-CM

## 2022-02-18 PROBLEM — S11.91XA LACERATION OF HEAD AND NECK: Status: ACTIVE | Noted: 2022-02-18

## 2022-02-18 PROBLEM — S01.91XA LACERATION OF HEAD AND NECK: Status: ACTIVE | Noted: 2022-02-18

## 2022-02-18 PROCEDURE — 1036F TOBACCO NON-USER: CPT | Performed by: FAMILY MEDICINE

## 2022-02-18 PROCEDURE — 99213 OFFICE O/P EST LOW 20 MIN: CPT | Performed by: FAMILY MEDICINE

## 2022-02-18 PROCEDURE — 3008F BODY MASS INDEX DOCD: CPT | Performed by: FAMILY MEDICINE

## 2022-02-18 NOTE — PROGRESS NOTES
Assessment/Plan:     1  Laceration of head and neck, subsequent encounter  Assessment & Plan:  Healing well; no signs of infection; UTD on tdap; wound care reviewed as well as home care for scar; f/u guidance given      2  Vasovagal episode  Assessment & Plan:  No further episodes; pt aware of preventative measures for injury          Subjective:      Patient ID: Zainab Dolan is a 55 y o  female  Patient states there was a little blood after getting out staples  No discharge  No redness or swelling  UTD on tdap 2017  Feels it is healing well  No further vasovagal episodes or symptoms  States she realizes she needs to be careful when having conversations about blood or surgery  The following portions of the patient's history were reviewed and updated as appropriate: allergies, current medications, past family history, past medical history, past social history, past surgical history, and problem list     Review of Systems   Constitutional: Negative for chills and fever  Respiratory: Negative for shortness of breath  Cardiovascular: Negative for chest pain and palpitations  Skin: Positive for wound  Objective:      /80 (BP Location: Right arm, Patient Position: Sitting, Cuff Size: Large)   Pulse 81   Temp 97 5 °F (36 4 °C) (Tympanic)   Ht 5' 2 21" (1 58 m)   Wt 92 5 kg (204 lb)   LMP 03/13/2018   SpO2 94%   BMI 37 06 kg/m²          Physical Exam  Vitals reviewed  Constitutional:       General: She is not in acute distress  Appearance: Normal appearance  She is not ill-appearing, toxic-appearing or diaphoretic  HENT:      Head: Normocephalic and atraumatic  Eyes:      General: No scleral icterus  Right eye: No discharge  Left eye: No discharge  Conjunctiva/sclera: Conjunctivae normal    Cardiovascular:      Rate and Rhythm: Normal rate and regular rhythm  Pulses: Normal pulses  Heart sounds: Normal heart sounds  No murmur heard    No gallop  Pulmonary:      Effort: Pulmonary effort is normal  No respiratory distress  Breath sounds: Normal breath sounds  No stridor  No wheezing, rhonchi or rales  Musculoskeletal:      Right lower leg: No edema  Left lower leg: No edema  Skin:         Neurological:      General: No focal deficit present  Mental Status: She is alert and oriented to person, place, and time  Psychiatric:         Mood and Affect: Mood normal          Behavior: Behavior normal          Thought Content:  Thought content normal          Judgment: Judgment normal

## 2022-02-18 NOTE — ASSESSMENT & PLAN NOTE
Healing well; no signs of infection; UTD on tdap; wound care reviewed as well as home care for scar; f/u guidance given

## 2022-03-21 DIAGNOSIS — E03.9 HYPOTHYROIDISM, UNSPECIFIED TYPE: ICD-10-CM

## 2022-03-21 RX ORDER — LEVOTHYROXINE SODIUM 0.05 MG/1
50 TABLET ORAL DAILY
Qty: 90 TABLET | Refills: 0 | Status: SHIPPED | OUTPATIENT
Start: 2022-03-21 | End: 2022-07-04 | Stop reason: SDUPTHER

## 2022-04-04 DIAGNOSIS — M54.50 ACUTE BILATERAL LOW BACK PAIN WITHOUT SCIATICA: ICD-10-CM

## 2022-04-04 RX ORDER — CYCLOBENZAPRINE HCL 5 MG
5 TABLET ORAL 3 TIMES DAILY PRN
Qty: 90 TABLET | Refills: 0 | Status: SHIPPED | OUTPATIENT
Start: 2022-04-04

## 2022-07-04 DIAGNOSIS — E03.9 HYPOTHYROIDISM, UNSPECIFIED TYPE: ICD-10-CM

## 2022-07-05 DIAGNOSIS — E78.2 MIXED HYPERLIPIDEMIA: ICD-10-CM

## 2022-07-05 DIAGNOSIS — E55.9 VITAMIN D DEFICIENCY: ICD-10-CM

## 2022-07-05 DIAGNOSIS — R73.9 ELEVATED BLOOD SUGAR: ICD-10-CM

## 2022-07-05 DIAGNOSIS — E03.9 ACQUIRED HYPOTHYROIDISM: Primary | ICD-10-CM

## 2022-07-05 RX ORDER — LEVOTHYROXINE SODIUM 0.05 MG/1
50 TABLET ORAL DAILY
Qty: 90 TABLET | Refills: 0 | Status: SHIPPED | OUTPATIENT
Start: 2022-07-05

## 2022-07-20 LAB — HBA1C MFR BLD HPLC: 5.7 %

## 2022-10-11 PROBLEM — S01.91XA LACERATION OF HEAD AND NECK: Status: RESOLVED | Noted: 2022-02-18 | Resolved: 2022-10-11

## 2022-10-11 PROBLEM — S11.91XA LACERATION OF HEAD AND NECK: Status: RESOLVED | Noted: 2022-02-18 | Resolved: 2022-10-11

## 2022-10-12 PROBLEM — S01.81XA LACERATION OF FOREHEAD: Status: RESOLVED | Noted: 2022-02-11 | Resolved: 2022-10-12

## 2023-01-07 DIAGNOSIS — E03.9 HYPOTHYROIDISM, UNSPECIFIED TYPE: ICD-10-CM

## 2023-01-07 DIAGNOSIS — M54.50 ACUTE BILATERAL LOW BACK PAIN WITHOUT SCIATICA: ICD-10-CM

## 2023-01-09 RX ORDER — LEVOTHYROXINE SODIUM 0.05 MG/1
50 TABLET ORAL DAILY
Qty: 90 TABLET | Refills: 0 | Status: SHIPPED | OUTPATIENT
Start: 2023-01-09

## 2023-01-09 RX ORDER — CYCLOBENZAPRINE HCL 5 MG
5 TABLET ORAL 3 TIMES DAILY PRN
Qty: 90 TABLET | Refills: 0 | Status: SHIPPED | OUTPATIENT
Start: 2023-01-09

## 2023-06-29 DIAGNOSIS — E03.9 ACQUIRED HYPOTHYROIDISM: Primary | ICD-10-CM

## 2023-06-29 DIAGNOSIS — E55.9 VITAMIN D DEFICIENCY: ICD-10-CM

## 2023-06-29 DIAGNOSIS — R73.9 ELEVATED BLOOD SUGAR: ICD-10-CM

## 2023-06-29 DIAGNOSIS — E78.2 MIXED HYPERLIPIDEMIA: ICD-10-CM

## 2023-08-03 LAB — HBA1C MFR BLD HPLC: 5.7 %

## 2023-08-16 ENCOUNTER — OFFICE VISIT (OUTPATIENT)
Dept: FAMILY MEDICINE CLINIC | Facility: CLINIC | Age: 58
End: 2023-08-16
Payer: COMMERCIAL

## 2023-08-16 VITALS
WEIGHT: 207.6 LBS | HEIGHT: 64 IN | HEART RATE: 91 BPM | TEMPERATURE: 98.6 F | BODY MASS INDEX: 35.44 KG/M2 | SYSTOLIC BLOOD PRESSURE: 122 MMHG | DIASTOLIC BLOOD PRESSURE: 86 MMHG

## 2023-08-16 DIAGNOSIS — Z00.00 ANNUAL PHYSICAL EXAM: Primary | ICD-10-CM

## 2023-08-16 DIAGNOSIS — R73.9 ELEVATED BLOOD SUGAR: ICD-10-CM

## 2023-08-16 DIAGNOSIS — E03.9 ACQUIRED HYPOTHYROIDISM: ICD-10-CM

## 2023-08-16 PROCEDURE — 99396 PREV VISIT EST AGE 40-64: CPT | Performed by: FAMILY MEDICINE

## 2023-08-16 NOTE — PATIENT INSTRUCTIONS
Check into appointment with Dr. Mc Jesus on Tdap immunization  Wellness Visit for Adults   AMBULATORY CARE:   A wellness visit  is when you see your healthcare provider to get screened for health problems. Your healthcare provider will also give you advice on how to stay healthy. Write down your questions so you remember to ask them. Ask your healthcare provider how often you should have a wellness visit. What happens at a wellness visit:  Your healthcare provider will ask about your health, and your family history of health problems. This includes high blood pressure, heart disease, and cancer. He or she will ask if you have symptoms that concern you, if you smoke, and about your mood. You may also be asked about your intake of medicines, supplements, food, and alcohol. Any of the following may be done: Your weight  will be checked. Your height may also be checked so your body mass index (BMI) can be calculated. Your BMI shows if you are at a healthy weight. Your blood pressure  and heart rate will be checked. Your temperature may also be checked. Blood and urine tests  may be done. Blood tests may be done to check your cholesterol levels. Abnormal cholesterol levels increase your risk for heart disease and stroke. You may also need a blood or urine test to check for diabetes if you are at increased risk. Urine tests may be done to look for signs of an infection or kidney disease. A physical exam  includes checking your heartbeat and lungs with a stethoscope. Your healthcare provider may also check your skin to look for sun damage. Screening tests  may be recommended. A screening test is done to check for diseases that may not cause symptoms. The screening tests you may need depend on your age, gender, family history, and lifestyle habits. For example, colorectal screening may be recommended if you are 48years old or older.     Screening tests you need if you are a woman:   A Pap smear  is used to screen for cervical cancer. Pap smears are usually done every 3 to 5 years depending on your age. You may need them more often if you have had abnormal Pap smear test results in the past. Ask your healthcare provider how often you should have a Pap smear. A mammogram  is an x-ray of your breasts to screen for breast cancer. Experts recommend mammograms every 2 years starting at age 48 years. You may need a mammogram at age 52 years or younger if you have an increased risk for breast cancer. Talk to your healthcare provider about when you should start having mammograms and how often you need them. Vaccines you may need:   Get an influenza vaccine  every year. The influenza vaccine protects you from the flu. Several types of viruses cause the flu. The viruses change over time, so new vaccines are made each year. Get a tetanus-diphtheria (Td) booster vaccine  every 10 years. This vaccine protects you against tetanus and diphtheria. Tetanus is a severe infection that may cause painful muscle spasms and lockjaw. Diphtheria is a severe bacterial infection that causes a thick covering in the back of your mouth and throat. Get a human papillomavirus (HPV) vaccine  if you are female and aged 23 to 32 or male 23 to 24 and never received it. This vaccine protects you from HPV infection. HPV is the most common infection spread by sexual contact. HPV may also cause vaginal, penile, and anal cancers. Get a pneumococcal vaccine  if you are aged 72 years or older. The pneumococcal vaccine is an injection given to protect you from pneumococcal disease. Pneumococcal disease is an infection caused by pneumococcal bacteria. The infection may cause pneumonia, meningitis, or an ear infection. Get a shingles vaccine  if you are 60 or older, even if you have had shingles before. The shingles vaccine is an injection to protect you from the varicella-zoster virus. This is the same virus that causes chickenpox. Shingles is a painful rash that develops in people who had chickenpox or have been exposed to the virus. How to eat healthy:  My Plate is a model for planning healthy meals. It shows the types and amounts of foods that should go on your plate. Fruits and vegetables make up about half of your plate, and grains and protein make up the other half. A serving of dairy is included on the side of your plate. The amount of calories and serving sizes you need depends on your age, gender, weight, and height. Examples of healthy foods are listed below:  Eat a variety of vegetables  such as dark green, red, and orange vegetables. You can also include canned vegetables low in sodium (salt) and frozen vegetables without added butter or sauces. Eat a variety of fresh fruits , canned fruit in 100% juice, frozen fruit, and dried fruit. Include whole grains. At least half of the grains you eat should be whole grains. Examples include whole-wheat bread, wheat pasta, brown rice, and whole-grain cereals such as oatmeal.    Eat a variety of protein foods such as seafood (fish and shellfish), lean meat, and poultry without skin (turkey and chicken). Examples of lean meats include pork leg, shoulder, or tenderloin, and beef round, sirloin, tenderloin, and extra lean ground beef. Other protein foods include eggs and egg substitutes, beans, peas, soy products, nuts, and seeds. Choose low-fat dairy products such as skim or 1% milk or low-fat yogurt, cheese, and cottage cheese. Limit unhealthy fats  such as butter, hard margarine, and shortening. Exercise:  Exercise at least 30 minutes per day on most days of the week. Some examples of exercise include walking, biking, dancing, and swimming. You can also fit in more physical activity by taking the stairs instead of the elevator or parking farther away from stores. Include muscle strengthening activities 2 days each week. Regular exercise provides many health benefits.  It helps you manage your weight, and decreases your risk for type 2 diabetes, heart disease, stroke, and high blood pressure. Exercise can also help improve your mood. Ask your healthcare provider about the best exercise plan for you. General health and safety guidelines:   Do not smoke. Nicotine and other chemicals in cigarettes and cigars can cause lung damage. Ask your healthcare provider for information if you currently smoke and need help to quit. E-cigarettes or smokeless tobacco still contain nicotine. Talk to your healthcare provider before you use these products. Limit alcohol. A drink of alcohol is 12 ounces of beer, 5 ounces of wine, or 1½ ounces of liquor. Lose weight, if needed. Being overweight increases your risk of certain health conditions. These include heart disease, high blood pressure, type 2 diabetes, and certain types of cancer. Protect your skin. Do not sunbathe or use tanning beds. Use sunscreen with a SPF 15 or higher. Apply sunscreen at least 15 minutes before you go outside. Reapply sunscreen every 2 hours. Wear protective clothing, hats, and sunglasses when you are outside. Drive safely. Always wear your seatbelt. Make sure everyone in your car wears a seatbelt. A seatbelt can save your life if you are in an accident. Do not use your cell phone when you are driving. This could distract you and cause an accident. Pull over if you need to make a call or send a text message. Practice safe sex. Use latex condoms if are sexually active and have more than one partner. Your healthcare provider may recommend screening tests for sexually transmitted infections (STIs). Wear helmets, lifejackets, and protective gear. Always wear a helmet when you ride a bike or motorcycle, go skiing, or play sports that could cause a head injury. Wear protective equipment when you play sports. Wear a lifejacket when you are on a boat or doing water sports.     © Copyright [employer] 2022 Information is for End User's use only and may not be sold, redistributed or otherwise used for commercial purposes. The above information is an  only. It is not intended as medical advice for individual conditions or treatments. Talk to your doctor, nurse or pharmacist before following any medical regimen to see if it is safe and effective for you.

## 2023-08-16 NOTE — PROGRESS NOTES
ADULT ANNUAL [facility]    NAME: Lillian Navarro  AGE: 65 y.o. SEX: female  : 1965     DATE: 2023     Assessment and Plan:   Patient is a 65year old seen for follow up of chronic medical conditions. Problem List Items Addressed This Visit        Endocrine    Hypothyroidism       Other    Elevated blood sugar   Other Visit Diagnoses     Annual physical exam    -  Primary      Discussed blood work. Is just getting back into exercise and eating right. Family stress. Immunizations and preventive care screenings were discussed with patient today. Appropriate education was printed on patient's after visit summary. Counseling:  Alcohol/drug use: discussed moderation in alcohol intake, the recommendations for healthy alcohol use, and avoidance of illicit drug use. Dental Health: discussed importance of regular tooth brushing, flossing, and dental visits. Exercise: the importance of regular exercise/physical activity was discussed. Recommend exercise 3-5 times per week for at least 30 minutes. BMI Counseling: Body mass index is 36.2 kg/m². The BMI is above normal. Nutrition recommendations include encouraging healthy choices of fruits and vegetables, moderation in carbohydrate intake, increasing intake of lean protein and reducing intake of saturated and trans fat. Exercise recommendations include moderate physical activity 150 minutes/week. No pharmacotherapy was ordered. Rationale for BMI follow-up plan is due to patient being overweight or obese. Return in about 1 year (around 2024). Chief Complaint:     No chief complaint on file. History of Present Illness:     Adult Annual Physical   Patient here for a comprehensive physical exam. The patient reports problems - with right hip pain. neck pain. She has been seeing chirparactor every couple weeks.     Diet and Physical Activity  Diet/Nutrition: working on diet.   Exercise: 3-4 times a week on average. Depression Screening  PHQ-2/9 Depression Screening    Little interest or pleasure in doing things: 0 - not at all  Feeling down, depressed, or hopeless: 0 - not at all  PHQ-2 Score: 0  PHQ-2 Interpretation: Negative depression screen       General Health  Sleep: sleeps poorly. Hearing: normal - bilateral.  Vision: no vision problems. Dental: regular dental visits. /GYN Health  Patient is: postmenopausal       Review of Systems:     Review of Systems   Constitutional: Negative for chills and fever. HENT: Negative for congestion and sore throat. Respiratory: Negative for chest tightness. Cardiovascular: Negative for chest pain and palpitations. Gastrointestinal: Negative for abdominal pain, constipation, diarrhea and nausea. Genitourinary: Negative for difficulty urinating. Musculoskeletal: Positive for arthralgias and back pain. Skin: Negative. Neurological: Negative for dizziness and headaches. Psychiatric/Behavioral: Negative. Past Medical History:     Past Medical History:   Diagnosis Date   • Allergic    • Allergic rhinitis     Last Assessed:2012   • Disease of thyroid gland    • Hiatal hernia     noted on GI xray. • Myxedema     Last Assessed:2013      Past Surgical History:     Past Surgical History:   Procedure Laterality Date   •  SECTION     • CHOLECYSTECTOMY     • COLONOSCOPY  2019    Dr Jaziel Nolasco. Tubular adenoma polyp 15 mm removed from distal descending colon, hyperplastic polyp removed from transverse colon, random biopsies negative for colitis. • EGD  2019    Dr Martin Allen, [facility]. Mild antral gastritis with biopsies negative, esophageal biopsy negative. EGD in  by Dr. Rosa Jones, normal with fundic gland polyp.       Social History:     Social History     Socioeconomic History   • Marital status: /Civil Union     Spouse name: None   • Number of children: None   • Years of education: None   • Highest education level: None   Occupational History     Employer: [employer]   Tobacco Use   • Smoking status: Never   • Smokeless tobacco: Never   Vaping Use   • Vaping Use: Never used   Substance and Sexual Activity   • Alcohol use: Yes     Alcohol/week: 0.0 standard drinks of alcohol     Comment: occasional   • Drug use: No   • Sexual activity: Yes     Partners: Male     Birth control/protection: Male Sterilization   Other Topics Concern   • None   Social History Narrative   • None     Social Determinants of Health     Financial Resource Strain: Not on file   Food Insecurity: Not on file   Transportation Needs: Not on file   Physical Activity: Not on file   Stress: Not on file   Social Connections: Not on file   Intimate Partner Violence: Not on file   Housing Stability: Not on file      Family History:     Family History   Problem Relation Age of Onset   • Lupus Mother    • Heart defect Mother    • Kidney disease Mother    • Arthritis Mother    • Rheum arthritis Mother    • Lung cancer Father    • Cancer Father    • Pulmonary embolism Father       Current Medications:     Current Outpatient Medications   Medication Sig Dispense Refill   • calcium citrate-vitamin D (CITRACAL+D) 315-200 MG-UNIT per tablet Take 1 tablet by mouth 2 (two) times a day     • cholecalciferol (VITAMIN D3) 25 mcg (1,000 units) tablet 1,000 Units daily     • cyclobenzaprine (FLEXERIL) 5 mg tablet Take 1 tablet (5 mg total) by mouth 3 (three) times a day as needed for muscle spasms 90 tablet 0   • levothyroxine 50 mcg tablet Take 1 tablet (50 mcg total) by mouth daily 90 tablet 0   • Multiple Vitamin (MULTIVITAMIN) capsule Take 1 capsule by mouth daily     • Omega-3 Fatty Acids (FISH OIL) 1,000 mg Take 1,000 mg by mouth daily     • TURMERIC CURCUMIN PO 1,000 mg       No current facility-administered medications for this visit. Allergies:      Allergies   Allergen Reactions   • Codeine GI Intolerance   • Tramadol GI Intolerance      Physical Exam:     /86 (BP Location: Right arm)   Pulse 91   Temp 98.6 °F (37 °C)   Ht 5' 3.5" (1.613 m)   Wt 94.2 kg (207 lb 9.6 oz)   LMP 03/13/2018   BMI 36.20 kg/m²     Physical Exam  Vitals and nursing note reviewed. Constitutional:       General: She is not in acute distress. Appearance: She is well-developed. She is obese. HENT:      Head: Normocephalic. Right Ear: Tympanic membrane normal.      Left Ear: Tympanic membrane normal.   Eyes:      General: No scleral icterus. Conjunctiva/sclera: Conjunctivae normal.   Neck:      Vascular: No carotid bruit. Cardiovascular:      Rate and Rhythm: Normal rate and regular rhythm. Heart sounds: No murmur heard. Pulmonary:      Effort: Pulmonary effort is normal. No respiratory distress. Breath sounds: Normal breath sounds. Abdominal:      Palpations: Abdomen is soft. Tenderness: There is no abdominal tenderness. Musculoskeletal:      Right lower leg: No edema. Left lower leg: No edema. Lymphadenopathy:      Cervical: No cervical adenopathy. Skin:     General: Skin is warm and dry. Neurological:      Mental Status: She is alert and oriented to person, place, and time.    Psychiatric:         Mood and Affect: Mood normal.          Bruno Castano,   [address]

## 2023-11-26 DIAGNOSIS — M54.50 ACUTE BILATERAL LOW BACK PAIN WITHOUT SCIATICA: ICD-10-CM

## 2023-11-26 DIAGNOSIS — E03.9 HYPOTHYROIDISM, UNSPECIFIED TYPE: ICD-10-CM

## 2023-11-27 RX ORDER — CYCLOBENZAPRINE HCL 5 MG
5 TABLET ORAL 3 TIMES DAILY PRN
Qty: 90 TABLET | Refills: 0 | Status: SHIPPED | OUTPATIENT
Start: 2023-11-27

## 2023-11-27 RX ORDER — LEVOTHYROXINE SODIUM 0.05 MG/1
50 TABLET ORAL DAILY
Qty: 90 TABLET | Refills: 1 | Status: SHIPPED | OUTPATIENT
Start: 2023-11-27

## 2023-12-06 DIAGNOSIS — D58.2 ELEVATED HEMOGLOBIN (HCC): Primary | ICD-10-CM

## 2023-12-07 ENCOUNTER — TELEPHONE (OUTPATIENT)
Dept: HEMATOLOGY ONCOLOGY | Facility: CLINIC | Age: 58
End: 2023-12-07

## 2023-12-07 NOTE — TELEPHONE ENCOUNTER
I called John Barnes in response to a referral that was received for patient to establish care with Hematology. Outreach was made to schedule a consultation. I left a voicemail explaining the reason for my call and advised patient to call [facility] at [phone]. Another attempt will be made to contact patient.

## 2023-12-08 ENCOUNTER — TELEPHONE (OUTPATIENT)
Dept: HEMATOLOGY ONCOLOGY | Facility: CLINIC | Age: 58
End: 2023-12-08

## 2023-12-08 NOTE — TELEPHONE ENCOUNTER
I called Antoine Walton in response to a referral that was received for patient to establish care with Hematology. Outreach was made to schedule a consultation. I left a voicemail explaining the reason for my call and advised patient to call [facility] at [phone]. Another attempt will be made to contact patient.

## 2023-12-15 ENCOUNTER — TELEPHONE (OUTPATIENT)
Dept: LAB | Facility: HOSPITAL | Age: 58
End: 2023-12-15

## 2023-12-18 ENCOUNTER — TRANSCRIBE ORDERS (OUTPATIENT)
Dept: LAB | Facility: HOSPITAL | Age: 58
End: 2023-12-18

## 2023-12-18 DIAGNOSIS — E66.3 SEVERELY OVERWEIGHT: ICD-10-CM

## 2023-12-18 DIAGNOSIS — R45.86 BRIEF COMPENSATORY MOOD SWINGS: ICD-10-CM

## 2023-12-18 DIAGNOSIS — N95.1 SYMPTOMATIC MENOPAUSAL OR FEMALE CLIMACTERIC STATES: Primary | ICD-10-CM

## 2023-12-18 DIAGNOSIS — R45.4 IRRITABLE MOOD: ICD-10-CM

## 2023-12-18 DIAGNOSIS — E03.9 MYXEDEMA HEART DISEASE: ICD-10-CM

## 2023-12-18 DIAGNOSIS — R73.03 DIABETES MELLITUS, LATENT: ICD-10-CM

## 2023-12-18 DIAGNOSIS — F45.8 ANXIETY HYPERVENTILATION: ICD-10-CM

## 2023-12-18 DIAGNOSIS — I51.9 MYXEDEMA HEART DISEASE: ICD-10-CM

## 2023-12-18 DIAGNOSIS — R68.82 DECREASED LIBIDO: ICD-10-CM

## 2023-12-18 DIAGNOSIS — G47.00 PERSISTENT DISORDER OF INITIATING OR MAINTAINING SLEEP: ICD-10-CM

## 2023-12-18 DIAGNOSIS — F41.9 ANXIETY HYPERVENTILATION: ICD-10-CM

## 2023-12-18 DIAGNOSIS — F32.A DEPRESSIVE TYPE PSYCHOSIS: ICD-10-CM

## 2024-02-05 ENCOUNTER — CONSULT (OUTPATIENT)
Dept: HEMATOLOGY ONCOLOGY | Facility: CLINIC | Age: 59
End: 2024-02-05
Payer: COMMERCIAL

## 2024-02-05 VITALS
TEMPERATURE: 97.2 F | HEIGHT: 63 IN | HEART RATE: 90 BPM | RESPIRATION RATE: 18 BRPM | OXYGEN SATURATION: 98 % | DIASTOLIC BLOOD PRESSURE: 70 MMHG | SYSTOLIC BLOOD PRESSURE: 132 MMHG | BODY MASS INDEX: 36.14 KG/M2 | WEIGHT: 204 LBS

## 2024-02-05 DIAGNOSIS — N95.1 MENOPAUSAL SYMPTOMS: ICD-10-CM

## 2024-02-05 DIAGNOSIS — D58.2 ELEVATED HEMOGLOBIN (HCC): Primary | ICD-10-CM

## 2024-02-05 DIAGNOSIS — J30.2 CHRONIC SEASONAL ALLERGIC RHINITIS: ICD-10-CM

## 2024-02-05 PROCEDURE — 99243 OFF/OP CNSLTJ NEW/EST LOW 30: CPT | Performed by: PHYSICIAN ASSISTANT

## 2024-02-05 NOTE — PROGRESS NOTES
[address]  [facility]ATOLOG[facility] [place]  [address]  [facility] [address]  Hematology Ambulatory Consult  Akila Loera, 1965, [id number]  2/5/2024      Assessment and Plan   1. Elevated hemoglobin (HCC); 2. Chronic seasonal allergic rhinitis  This is a 58-year-old female with past medical history of the above.  Hematocrit and hemoglobin elevations have been persistently elevated since 2005.  Patient does not demonstrate any progressive symptoms.  Patient has no symptoms of myeloproliferative neoplasms, history of blood clots/stroke/heart attack.    I believe that with this persistent elevation that the patient has an element of relative polycythemia with dehydration.  Most of patient's laboratory assessments are completed fasting.  While the patient does not recall the general situation among many of these test, most of the time stamps are in the morning.    We discussed reducing the amount of caffeine in her diet (drinks approximately 8 cups of 8 ounce caffeinated tea daily with only 24 ounces of water) and increasing total water intake.  Patient will undergo nonfasting blood work assessment with erythropoietin level and CBC at her earliest convenience.  If these test demonstrate recovery of hemoglobin and hematocrit, no additional diagnostics are necessary and the patient will be discharged from the practice.    If the patient's hemoglobin and hematocrit do not change, additional testing which will include Oswald mutational testing will be completed prior to her follow-up in the office.  (Testing included: Oswald with reflex, urinalysis, carbon monoxide level, repeat CBC, cmp)    Patient voiced understanding and agreement to the above.  She understands to contact the office with any questions or concerns.  - Ambulatory Referral to Hematology / Oncology  - CBC and differential; Future  - Erythropoietin; Future    3. Menopausal symptoms  Patient has menopausal symptoms and is  investigating hormone replacement therapy.  If patient's polycythemia is relative, there is no contraindication to use of hormone replacement therapy.    The patient is scheduled for follow-up in approximately 6-8 weeks.     Patient voiced agreement and understanding to the above.   Patient knows to call the Hematology/Oncology office with any questions and concerns regarding the above.    Barrier(s) to care: None.   The patient is  able to self care.    Chula Lay PA-C  Medical Oncology/Hematology  [facility]    Subjective     Chief Complaint   Patient presents with    Consult     Referring provider    Lea Yoon DO  [address]  [address]  [place] [address]    History of present illness:   This is a 58-year-old female with past medical history of allergic rhinitis, thyroid disease, hiatal hernia who presents to the hematology clinic for evaluation of longstanding polycythemia.    Laboratory trends:  6/2/2005:    WBC 7.0, RBC 5.2, hemoglobin 15.9, hematocrit 48.4, platelet 291  10/18/2016 WBC 6.3, RBC 5.1, hemoglobin 15.7, hematocrit 47.3, platelet 231  5/3/2019     WBC 6.2, RBC 5.3, hemoglobin 16.1, hematocrit 48.6, platelet 303  1/27/2021   WBC 5.8, RBC 5.2, hemoglobin 16.0, hematocrit 48.0, platelet 286  2/5/2022     WBC 7.8, RBC 5.2, hemoglobin 16.2, hematocrit 49.9, platelet 267  8/3/2023     WBC 4.9, RBC 5.1, hemoglobin 16.0, hematocrit 47.5, platelet 234    Ferritin 65.2, iron saturation 19%, TIBC 421, B12 550    Patient has a long history of an elevated hemoglobin which has always fluctuated between 15.7 up to 16.2 and hematocrit that is always stayed below 50%.  Patient is undergoing evaluation for postmenopausal symptoms.  Patient is considering future use of hormonal therapy and therefore investigation regarding persistent polycythemia was requested by endocrinology.    Patient is a non-smoker, patient does have seasonal allergies but no diagnosis of COPD or  persisting asthma.  Patient does note that when she gets congested that she does tend to snore no reported apneic episodes or evaluation for sleep apnea.  Patient denies cardiac issues.  Patient exercises 5 times a week does a mixture of cardiovascular and weightlifting.    Patient is presently seeing a naturopath he who has recommended supplements.  Patient is not on a diuretic/flozin/hormones.     24: Patient generally feels well.  Patient notes that she would entertain hormonal replacement if she felt that a more natural approach was not working.     Review of Systems   Constitutional:  Negative for appetite change, fatigue, fever and unexpected weight change.   HENT:  Negative for nosebleeds.    Respiratory:  Negative for cough, choking and shortness of breath.         Negative hemoptysis.   Cardiovascular:  Negative for chest pain, palpitations and leg swelling.   Gastrointestinal: Negative.  Negative for abdominal distention, abdominal pain, anal bleeding, blood in stool, constipation, diarrhea, nausea and vomiting.   Endocrine: Negative.  Negative for cold intolerance.   Genitourinary: Negative.  Negative for hematuria, menstrual problem, vaginal bleeding, vaginal discharge and vaginal pain.   Musculoskeletal: Negative.  Negative for arthralgias, myalgias, neck pain and neck stiffness.   Skin: Negative.  Negative for color change, pallor and rash.   Allergic/Immunologic: Negative.  Negative for immunocompromised state.   Neurological: Negative.  Negative for weakness and headaches.   Hematological:  Negative for adenopathy. Does not bruise/bleed easily.   All other systems reviewed and are negative.      Past Medical History:   Diagnosis Date    Allergic     Allergic rhinitis     Last Assessed:2012    Disease of thyroid gland     Hiatal hernia     noted on GI xray.     Myxedema     Last Assessed:2013     Past Surgical History:   Procedure Laterality Date     SECTION      CHOLECYSTECTOMY       COLONOSCOPY  09/2019    JENNIFER Mauro. Tubular adenoma polyp 15 mm removed from distal descending colon, hyperplastic polyp removed from transverse colon, random biopsies negative for colitis.    EGD  09/2019    JENNIFER Mauro. Mild antral gastritis with biopsies negative, esophageal biopsy negative.  EGD in 2005 by Dr. Cueto, normal with fundic gland polyp.     Family History   Problem Relation Age of Onset    Lupus Mother     Heart defect Mother     Kidney disease Mother     Arthritis Mother     Rheum arthritis Mother     Lung cancer Father     Cancer Father     Pulmonary embolism Father      Social History     Socioeconomic History    Marital status: /Civil Union     Spouse name: None    Number of children: None    Years of education: None    Highest education level: None   Occupational History     Employer: [employer]   Tobacco Use    Smoking status: Never    Smokeless tobacco: Never   Vaping Use    Vaping status: Never Used   Substance and Sexual Activity    Alcohol use: Yes     Alcohol/week: 0.0 standard drinks of alcohol     Comment: occasional    Drug use: No    Sexual activity: Yes     Partners: Male     Birth control/protection: Male Sterilization   Other Topics Concern    None   Social History Narrative    None     Social Determinants of Health     Financial Resource Strain: Not on file   Food Insecurity: Not on file   Transportation Needs: Not on file   Physical Activity: Not on file   Stress: Not on file   Social Connections: Not on file   Intimate Partner Violence: Not on file   Housing Stability: Not on file         Current Outpatient Medications:     Berberine Chloride 500 MG CAPS, Take by mouth, Disp: , Rfl:     BLACK COHOSH PO, Take by mouth, Disp: , Rfl:     calcium citrate-vitamin D (CITRACAL+D) 315-200 MG-UNIT per tablet, Take 1 tablet by mouth 2 (two) times a day, Disp: , Rfl:     cholecalciferol (VITAMIN D3) 25 mcg (1,000 units) tablet, 1,000 Units daily, Disp: , Rfl:  "    Coenzyme Q10 (CO Q 10 PO), Take 300 mg by mouth in the morning, Disp: , Rfl:     cyclobenzaprine (FLEXERIL) 5 mg tablet, Take 1 tablet (5 mg total) by mouth 3 (three) times a day as needed for muscle spasms, Disp: 90 tablet, Rfl: 0    levothyroxine 50 mcg tablet, Take 1 tablet (50 mcg total) by mouth daily, Disp: 90 tablet, Rfl: 1    Multiple Vitamin (MULTIVITAMIN) capsule, Take 1 capsule by mouth daily, Disp: , Rfl:     Omega-3 Fatty Acids (FISH OIL) 1,000 mg, Take 1,000 mg by mouth daily, Disp: , Rfl:     Red Yeast Rice Extract (RED YEAST RICE PO), Take by mouth 2 (two) times a day, Disp: , Rfl:     TURMERIC CURCUMIN PO, 1,000 mg, Disp: , Rfl:   Allergies   Allergen Reactions    Codeine GI Intolerance    Tramadol GI Intolerance       Objective   /70 (BP Location: Right arm, Patient Position: Sitting, Cuff Size: Adult)   Pulse 90   Temp (!) 97.2 °F (36.2 °C)   Resp 18   Ht 5' 3\" (1.6 m)   Wt 92.5 kg (204 lb)   LMP 03/13/2018   SpO2 98%   BMI 36.14 kg/m²   Physical Exam  Constitutional:       General: She is not in acute distress.     Appearance: She is well-developed.   HENT:      Head: Normocephalic and atraumatic.   Eyes:      General: No scleral icterus.     Conjunctiva/sclera: Conjunctivae normal.   Cardiovascular:      Rate and Rhythm: Normal rate.   Pulmonary:      Effort: Pulmonary effort is normal. No respiratory distress.   Skin:     General: Skin is warm.      Coloration: Skin is not pale.      Findings: No rash.   Neurological:      Mental Status: She is alert and oriented to person, place, and time.   Psychiatric:         Thought Content: Thought content normal.         Result Review  Labs:    Imaging:     Please note:  This report has been generated by a voice recognition software system. Therefore there may be syntax, spelling, and/or grammatical errors. Please call if you have any questions.  "

## 2024-02-05 NOTE — PATIENT INSTRUCTIONS
[facility] Medical Oncology and Hematology Team  [place] - [phone]    Your Team Members:  Advanced Practitioner:  Chula Lay PA-C  Oncology Nurse:   JORDYN Segura ([phone]) M-F 8am - 4:30pm    Please answer Private and Unavailable Calls - this may be your team(s) contacting you.  If you have medical questions/concerns/issues - contact us either by (1) My Chart (2) [place]

## 2024-03-28 DIAGNOSIS — D58.2 ELEVATED HEMOGLOBIN (HCC): Primary | ICD-10-CM

## 2024-03-29 ENCOUNTER — TELEPHONE (OUTPATIENT)
Dept: HEMATOLOGY ONCOLOGY | Facility: CLINIC | Age: 59
End: 2024-03-29

## 2024-03-29 NOTE — TELEPHONE ENCOUNTER
Called to reschedule appointment per Imelda. Left message and provided patient with new date and time. Provided patient with call back number if date and time does not work.

## 2024-04-04 ENCOUNTER — APPOINTMENT (OUTPATIENT)
Dept: LAB | Facility: HOSPITAL | Age: 59
End: 2024-04-04
Payer: COMMERCIAL

## 2024-04-04 DIAGNOSIS — D58.2 ELEVATED HEMOGLOBIN (HCC): ICD-10-CM

## 2024-04-04 LAB
ALBUMIN SERPL BCP-MCNC: 3.9 G/DL (ref 3.5–5)
ALP SERPL-CCNC: 86 U/L (ref 34–104)
ALT SERPL W P-5'-P-CCNC: 22 U/L (ref 7–52)
ANION GAP SERPL CALCULATED.3IONS-SCNC: 3 MMOL/L (ref 4–13)
AST SERPL W P-5'-P-CCNC: 16 U/L (ref 13–39)
BACTERIA UR QL AUTO: ABNORMAL /HPF
BASOPHILS # BLD AUTO: 0.04 THOUSANDS/ÂΜL (ref 0–0.1)
BASOPHILS NFR BLD AUTO: 1 % (ref 0–1)
BILIRUB SERPL-MCNC: 0.63 MG/DL (ref 0.2–1)
BILIRUB UR QL STRIP: NEGATIVE
BUN SERPL-MCNC: 13 MG/DL (ref 5–25)
CALCIUM SERPL-MCNC: 9.4 MG/DL (ref 8.4–10.2)
CHLORIDE SERPL-SCNC: 108 MMOL/L (ref 96–108)
CLARITY UR: CLEAR
CO2 SERPL-SCNC: 31 MMOL/L (ref 21–32)
COLOR UR: COLORLESS
CREAT SERPL-MCNC: 0.8 MG/DL (ref 0.6–1.3)
EOSINOPHIL # BLD AUTO: 0.09 THOUSAND/ÂΜL (ref 0–0.61)
EOSINOPHIL NFR BLD AUTO: 2 % (ref 0–6)
ERYTHROCYTE [DISTWIDTH] IN BLOOD BY AUTOMATED COUNT: 13.4 % (ref 11.6–15.1)
GAS + CO PNL BLDA: 1.7 % (ref 0–1.5)
GFR SERPL CREATININE-BSD FRML MDRD: 82 ML/MIN/1.73SQ M
GLUCOSE SERPL-MCNC: 123 MG/DL (ref 65–140)
GLUCOSE UR STRIP-MCNC: NEGATIVE MG/DL
HCT VFR BLD AUTO: 49.6 % (ref 34.8–46.1)
HGB BLD-MCNC: 16.5 G/DL (ref 11.5–15.4)
HGB UR QL STRIP.AUTO: NEGATIVE
IMM GRANULOCYTES # BLD AUTO: 0.02 THOUSAND/UL (ref 0–0.2)
IMM GRANULOCYTES NFR BLD AUTO: 0 % (ref 0–2)
KETONES UR STRIP-MCNC: NEGATIVE MG/DL
LEUKOCYTE ESTERASE UR QL STRIP: ABNORMAL
LYMPHOCYTES # BLD AUTO: 1.38 THOUSANDS/ÂΜL (ref 0.6–4.47)
LYMPHOCYTES NFR BLD AUTO: 26 % (ref 14–44)
MCH RBC QN AUTO: 30.2 PG (ref 26.8–34.3)
MCHC RBC AUTO-ENTMCNC: 33.3 G/DL (ref 31.4–37.4)
MCV RBC AUTO: 91 FL (ref 82–98)
MONOCYTES # BLD AUTO: 0.27 THOUSAND/ÂΜL (ref 0.17–1.22)
MONOCYTES NFR BLD AUTO: 5 % (ref 4–12)
NEUTROPHILS # BLD AUTO: 3.57 THOUSANDS/ÂΜL (ref 1.85–7.62)
NEUTS SEG NFR BLD AUTO: 66 % (ref 43–75)
NITRITE UR QL STRIP: NEGATIVE
NON-SQ EPI CELLS URNS QL MICRO: ABNORMAL /HPF
NRBC BLD AUTO-RTO: 0 /100 WBCS
PH UR STRIP.AUTO: 6.5 [PH]
PLATELET # BLD AUTO: 334 THOUSANDS/UL (ref 149–390)
PMV BLD AUTO: 10.4 FL (ref 8.9–12.7)
POTASSIUM SERPL-SCNC: 4.4 MMOL/L (ref 3.5–5.3)
PROT SERPL-MCNC: 7.2 G/DL (ref 6.4–8.4)
PROT UR STRIP-MCNC: NEGATIVE MG/DL
RBC # BLD AUTO: 5.46 MILLION/UL (ref 3.81–5.12)
RBC #/AREA URNS AUTO: ABNORMAL /HPF
SODIUM SERPL-SCNC: 142 MMOL/L (ref 135–147)
SP GR UR STRIP.AUTO: 1 (ref 1–1.03)
UROBILINOGEN UR STRIP-ACNC: <2 MG/DL
WBC # BLD AUTO: 5.37 THOUSAND/UL (ref 4.31–10.16)
WBC #/AREA URNS AUTO: ABNORMAL /HPF

## 2024-04-04 PROCEDURE — 36415 COLL VENOUS BLD VENIPUNCTURE: CPT

## 2024-04-04 PROCEDURE — 81001 URINALYSIS AUTO W/SCOPE: CPT

## 2024-04-04 PROCEDURE — 80053 COMPREHEN METABOLIC PANEL: CPT

## 2024-04-04 PROCEDURE — 85025 COMPLETE CBC W/AUTO DIFF WBC: CPT

## 2024-04-04 PROCEDURE — 82375 ASSAY CARBOXYHB QUANT: CPT

## 2024-04-04 PROCEDURE — 81270 JAK2 GENE: CPT

## 2024-04-14 LAB
CALR RESULT: NORMAL
CITATION REF LAB TEST: NORMAL
E12-15 RESULT: NORMAL
JAK2 P.V617F BLD/T QL: NORMAL
LAB DIRECTOR NAME PROVIDER: NORMAL
MPL RESULT: NORMAL
REF LAB TEST METHOD: NORMAL
SL AMB REFLEX: NORMAL
TEST PERFORMANCE INFO SPEC: NORMAL

## 2024-04-15 ENCOUNTER — TELEPHONE (OUTPATIENT)
Dept: HEMATOLOGY ONCOLOGY | Facility: CLINIC | Age: 59
End: 2024-04-15

## 2024-04-15 DIAGNOSIS — D58.2 ELEVATED HEMOGLOBIN (HCC): Primary | ICD-10-CM

## 2024-04-15 NOTE — TELEPHONE ENCOUNTER
----- Message from Chula Lay PA-C sent at 4/15/2024 10:51 AM EDT -----  No underlying MPN disorder.  Carbonmonoxide slightly high. Will repeat in 2 months prior to follow up with [facility].      Attempted to phone patient with above information.  Left voice message with direct call back number for questions

## 2024-05-29 ENCOUNTER — APPOINTMENT (OUTPATIENT)
Dept: LAB | Facility: HOSPITAL | Age: 59
End: 2024-05-29
Payer: COMMERCIAL

## 2024-05-29 DIAGNOSIS — D58.2 ELEVATED HEMOGLOBIN (HCC): ICD-10-CM

## 2024-05-29 LAB
BASOPHILS # BLD AUTO: 0.04 THOUSANDS/ÂΜL (ref 0–0.1)
BASOPHILS NFR BLD AUTO: 1 % (ref 0–1)
EOSINOPHIL # BLD AUTO: 0.16 THOUSAND/ÂΜL (ref 0–0.61)
EOSINOPHIL NFR BLD AUTO: 3 % (ref 0–6)
ERYTHROCYTE [DISTWIDTH] IN BLOOD BY AUTOMATED COUNT: 13.3 % (ref 11.6–15.1)
GAS + CO PNL BLDA: 1.8 % (ref 0–1.5)
HCT VFR BLD AUTO: 48.6 % (ref 34.8–46.1)
HGB BLD-MCNC: 16.4 G/DL (ref 11.5–15.4)
IMM GRANULOCYTES # BLD AUTO: 0.02 THOUSAND/UL (ref 0–0.2)
IMM GRANULOCYTES NFR BLD AUTO: 0 % (ref 0–2)
LYMPHOCYTES # BLD AUTO: 1.7 THOUSANDS/ÂΜL (ref 0.6–4.47)
LYMPHOCYTES NFR BLD AUTO: 29 % (ref 14–44)
MCH RBC QN AUTO: 30.5 PG (ref 26.8–34.3)
MCHC RBC AUTO-ENTMCNC: 33.7 G/DL (ref 31.4–37.4)
MCV RBC AUTO: 91 FL (ref 82–98)
MONOCYTES # BLD AUTO: 0.36 THOUSAND/ÂΜL (ref 0.17–1.22)
MONOCYTES NFR BLD AUTO: 6 % (ref 4–12)
NEUTROPHILS # BLD AUTO: 3.55 THOUSANDS/ÂΜL (ref 1.85–7.62)
NEUTS SEG NFR BLD AUTO: 61 % (ref 43–75)
NRBC BLD AUTO-RTO: 0 /100 WBCS
PLATELET # BLD AUTO: 286 THOUSANDS/UL (ref 149–390)
PMV BLD AUTO: 9.8 FL (ref 8.9–12.7)
RBC # BLD AUTO: 5.37 MILLION/UL (ref 3.81–5.12)
WBC # BLD AUTO: 5.83 THOUSAND/UL (ref 4.31–10.16)

## 2024-05-29 PROCEDURE — 36415 COLL VENOUS BLD VENIPUNCTURE: CPT

## 2024-05-29 PROCEDURE — 85025 COMPLETE CBC W/AUTO DIFF WBC: CPT

## 2024-05-29 PROCEDURE — 82375 ASSAY CARBOXYHB QUANT: CPT

## 2024-06-11 ENCOUNTER — OFFICE VISIT (OUTPATIENT)
Dept: HEMATOLOGY ONCOLOGY | Facility: CLINIC | Age: 59
End: 2024-06-11
Payer: COMMERCIAL

## 2024-06-11 VITALS
DIASTOLIC BLOOD PRESSURE: 80 MMHG | RESPIRATION RATE: 16 BRPM | HEIGHT: 63 IN | WEIGHT: 199 LBS | BODY MASS INDEX: 35.26 KG/M2 | TEMPERATURE: 97.1 F | SYSTOLIC BLOOD PRESSURE: 136 MMHG | HEART RATE: 102 BPM | OXYGEN SATURATION: 97 %

## 2024-06-11 DIAGNOSIS — D58.2 ELEVATED HEMOGLOBIN (HCC): Primary | ICD-10-CM

## 2024-06-11 PROCEDURE — 99215 OFFICE O/P EST HI 40 MIN: CPT | Performed by: PHYSICIAN ASSISTANT

## 2024-06-11 NOTE — PROGRESS NOTES
[address]  [facility] HEMATOLOGY ONCOLOGY SPECIALISTS [place]  [address] [address]  Hematology Ambulatory Follow-Up  Akila Loera, 1965, [id number]  6/11/2024      Assessment and Plan   1. Elevated hemoglobin (HCC)  Secondary polycythemia likely due to hypoperfusion/ventilation related to upper airway issues versus obstructive sleep apnea.  At this time with a mildly increased carbon monoxide level, recommended follow-up with primary care for additional workup.    Discussed with the patient following up with dentist regarding a evaluation for mouthguard to help with any type of upper airway induced obstructive sleep apnea.  Patient does not wish to undergo sleep study at this time but may be open to it in the future.  I have recommended that the patient hydrate very well prior to all blood testing especially for CBC.  If patient is fasting she should increase the amount of fluids prior to 32 ounces of water before blood draw.  I remain available to the patient is abnormal laboratory findings occur.  I am also available to the patient's medical team if there is any specific questions regarding abnormal CBC.    Of note patient's poor sleep quality is not 100% physiologic.  There are some functional/mental underlying issues that are confounding ability to sleep well.  Offered support.  Recommended following up with counseling, patient is not willing to do that at this time.  Remain available if she should change her mind in the future.      The patient is scheduled for follow-up in approximately PRN.      Patient voiced agreement and understanding to the above.   Patient advised to call the Hematology/Oncology office with any questions and concerns regarding the above.    Barrier(s) to care: None  The patient is able to self care.    Chula Lay PA-C  Medical Oncology/Hematology  [facility]    Subjective     Chief Complaint   Patient presents with     Follow-up       History of present illness:   This is a 58-year-old female with past medical history of allergic rhinitis, thyroid disease, hiatal hernia who presents to the hematology clinic for evaluation of longstanding polycythemia.     Laboratory trends:  6/2/2005:    WBC 7.0, RBC 5.2, hemoglobin 15.9, hematocrit 48.4, platelet 291  10/18/2016 WBC 6.3, RBC 5.1, hemoglobin 15.7, hematocrit 47.3, platelet 231  5/3/2019     WBC 6.2, RBC 5.3, hemoglobin 16.1, hematocrit 48.6, platelet 303  1/27/2021   WBC 5.8, RBC 5.2, hemoglobin 16.0, hematocrit 48.0, platelet 286  2/5/2022     WBC 7.8, RBC 5.2, hemoglobin 16.2, hematocrit 49.9, platelet 267  8/3/2023     WBC 4.9, RBC 5.1, hemoglobin 16.0, hematocrit 47.5, platelet 234                          Ferritin 65.2, iron saturation 19%, TIBC 421, B12 550     Patient has a long history of an elevated hemoglobin which has always fluctuated between 15.7 up to 16.2 and hematocrit that is always stayed below 50%.  Patient is undergoing evaluation for postmenopausal symptoms.  Patient is considering future use of hormonal therapy and therefore investigation regarding persistent polycythemia was requested by endocrinology.     Patient is a non-smoker, patient does have seasonal allergies but no diagnosis of COPD or persisting asthma.  Patient does note that when she gets congested that she does tend to snore no reported apneic episodes or evaluation for sleep apnea.  Patient denies cardiac issues.  Patient exercises 5 times a week does a mixture of cardiovascular and weightlifting.     Patient is presently seeing a naturopath he who has recommended supplements.  Patient is not on a diuretic/flozin/hormones.      02/05/24: Patient generally feels well.  Patient notes that she would entertain hormonal replacement if she felt that a more natural approach was not working.      2/14/2024: Patient WBC 9.9 hemoglobin 16.1, platelet count 297   Epo 15    4/4/2024: WBC 5.3, hemoglobin  16.5, MCV 91, plt 334  Carbon monoxide 1.7, JAK2 mutation negative, CALR, MPL and exon 12 through 15 mutation negative.    2024 WBC 4.8, hemoglobin 16.4 hematocrit 40.6, platelet count 248   Carbon monoxide 1.7   BUN 13 creatinine 0.80    Interval history:    Review of Systems   Constitutional:  Negative for appetite change, fatigue, fever and unexpected weight change.   HENT:  Negative for nosebleeds.    Respiratory:  Negative for cough, choking and shortness of breath.         Negative hemoptysis.   Cardiovascular:  Negative for chest pain, palpitations and leg swelling.   Gastrointestinal: Negative.  Negative for abdominal distention, abdominal pain, anal bleeding, blood in stool, constipation, diarrhea, nausea and vomiting.   Endocrine: Negative.  Negative for cold intolerance.   Genitourinary: Negative.  Negative for hematuria, menstrual problem, vaginal bleeding, vaginal discharge and vaginal pain.   Musculoskeletal: Negative.  Negative for arthralgias, myalgias, neck pain and neck stiffness.   Skin: Negative.  Negative for color change, pallor and rash.   Allergic/Immunologic: Negative.  Negative for immunocompromised state.   Neurological: Negative.  Negative for weakness and headaches.   Hematological:  Negative for adenopathy. Does not bruise/bleed easily.   All other systems reviewed and are negative.      Patient Active Problem List   Diagnosis    Elevated blood sugar    Hyperlipidemia    Hypothyroidism    Chronic seasonal allergic rhinitis    Irritable bowel syndrome    Vitamin D deficiency    Vasovagal episode     Past Medical History:   Diagnosis Date    Allergic     Allergic rhinitis     Last Assessed:2012    Disease of thyroid gland     Hiatal hernia     noted on GI xray.     Myxedema     Last Assessed:2013     Past Surgical History:   Procedure Laterality Date     SECTION      CHOLECYSTECTOMY      COLONOSCOPY  2019    Dr Ling, [facility]. Tubular adenoma polyp 15 mm removed  from distal descending colon, hyperplastic polyp removed from transverse colon, random biopsies negative for colitis.    EGD  09/2019    Dr Ling, [facility]. Mild antral gastritis with biopsies negative, esophageal biopsy negative.  EGD in 2005 by Dr. Cueto, normal with fundic gland polyp.    UPPER GASTROINTESTINAL ENDOSCOPY       Family History   Problem Relation Age of Onset    Lupus Mother     Heart defect Mother     Kidney disease Mother     Arthritis Mother     Rheum arthritis Mother     Lung cancer Father     Cancer Father     Pulmonary embolism Father      Social History     Socioeconomic History    Marital status: /Civil Union     Spouse name: None    Number of children: None    Years of education: None    Highest education level: None   Occupational History     Employer: [employer]   Tobacco Use    Smoking status: Never     Passive exposure: Never    Smokeless tobacco: Never   Vaping Use    Vaping status: Never Used   Substance and Sexual Activity    Alcohol use: Yes     Comment: occasional    Drug use: No    Sexual activity: Yes     Partners: Male     Birth control/protection: Male Sterilization   Other Topics Concern    None   Social History Narrative    None     Social Determinants of Health     Financial Resource Strain: Not on file   Food Insecurity: Not on file   Transportation Needs: Not on file   Physical Activity: Not on file   Stress: Not on file   Social Connections: Not on file   Intimate Partner Violence: Not on file   Housing Stability: Not on file       Current Outpatient Medications:     Berberine Chloride 500 MG CAPS, Take by mouth, Disp: , Rfl:     BLACK COHOSH PO, Take by mouth, Disp: , Rfl:     calcium citrate-vitamin D (CITRACAL+D) 315-200 MG-UNIT per tablet, Take 1 tablet by mouth 2 (two) times a day, Disp: , Rfl:     cholecalciferol (VITAMIN D3) 25 mcg (1,000 units) tablet, 1,000 Units daily, Disp: , Rfl:     Coenzyme Q10 (CO Q 10 PO), Take 300 mg by mouth in the morning,  "Disp: , Rfl:     cyclobenzaprine (FLEXERIL) 5 mg tablet, Take 1 tablet (5 mg total) by mouth 3 (three) times a day as needed for muscle spasms, Disp: 90 tablet, Rfl: 0    levothyroxine 50 mcg tablet, Take 1 tablet (50 mcg total) by mouth daily, Disp: 90 tablet, Rfl: 1    Multiple Vitamin (MULTIVITAMIN) capsule, Take 1 capsule by mouth daily, Disp: , Rfl:     Omega-3 Fatty Acids (FISH OIL) 1,000 mg, Take 1,000 mg by mouth daily, Disp: , Rfl:     Red Yeast Rice Extract (RED YEAST RICE PO), Take by mouth 2 (two) times a day, Disp: , Rfl:     TURMERIC CURCUMIN PO, 1,000 mg, Disp: , Rfl:   Allergies   Allergen Reactions    Codeine GI Intolerance    Tramadol GI Intolerance       Objective   /80 (BP Location: Left arm, Patient Position: Sitting, Cuff Size: Adult)   Pulse 102   Temp (!) 97.1 °F (36.2 °C) (Temporal)   Resp 16   Ht 5' 3\" (1.6 m)   Wt 90.3 kg (199 lb)   LMP 03/13/2018   SpO2 97%   BMI 35.25 kg/m²    Physical Exam  Constitutional:       General: She is not in acute distress.     Appearance: She is well-developed.      Comments: Central obestity   HENT:      Head: Normocephalic and atraumatic.   Eyes:      General: No scleral icterus.     Conjunctiva/sclera: Conjunctivae normal.   Cardiovascular:      Rate and Rhythm: Normal rate.   Pulmonary:      Effort: Pulmonary effort is normal. No respiratory distress.   Skin:     General: Skin is warm.      Coloration: Skin is not pale.      Findings: No rash.   Neurological:      Mental Status: She is alert and oriented to person, place, and time.   Psychiatric:         Thought Content: Thought content normal.         Result Review  Appointment on 05/29/2024   Component Date Value Ref Range Status    WBC 05/29/2024 5.83  4.31 - 10.16 Thousand/uL Final    RBC 05/29/2024 5.37 (H)  3.81 - 5.12 Million/uL Final    Hemoglobin 05/29/2024 16.4 (H)  11.5 - 15.4 g/dL Final    Hematocrit 05/29/2024 48.6 (H)  34.8 - 46.1 % Final    MCV 05/29/2024 91  82 - 98 fL Final "    MCH 05/29/2024 30.5  26.8 - 34.3 pg Final    MCHC 05/29/2024 33.7  31.4 - 37.4 g/dL Final    RDW 05/29/2024 13.3  11.6 - 15.1 % Final    MPV 05/29/2024 9.8  8.9 - 12.7 fL Final    Platelets 05/29/2024 286  149 - 390 Thousands/uL Final    nRBC 05/29/2024 0  /100 WBCs Final    Segmented % 05/29/2024 61  43 - 75 % Final    Immature Grans % 05/29/2024 0  0 - 2 % Final    Lymphocytes % 05/29/2024 29  14 - 44 % Final    Monocytes % 05/29/2024 6  4 - 12 % Final    Eosinophils Relative 05/29/2024 3  0 - 6 % Final    Basophils Relative 05/29/2024 1  0 - 1 % Final    Absolute Neutrophils 05/29/2024 3.55  1.85 - 7.62 Thousands/µL Final    Absolute Immature Grans 05/29/2024 0.02  0.00 - 0.20 Thousand/uL Final    Absolute Lymphocytes 05/29/2024 1.70  0.60 - 4.47 Thousands/µL Final    Absolute Monocytes 05/29/2024 0.36  0.17 - 1.22 Thousand/µL Final    Eosinophils Absolute 05/29/2024 0.16  0.00 - 0.61 Thousand/µL Final    Basophils Absolute 05/29/2024 0.04  0.00 - 0.10 Thousands/µL Final    Carbon Monoxide, Blood 05/29/2024 1.8 (H)  0.0 - 1.5 % Final       Please note:  This report has been generated by a voice recognition software system. Therefore there may be syntax, spelling, and/or grammatical errors. Please call if you have any questions.

## 2024-08-07 DIAGNOSIS — E03.9 HYPOTHYROIDISM, UNSPECIFIED TYPE: ICD-10-CM

## 2024-08-07 RX ORDER — LEVOTHYROXINE SODIUM 0.05 MG/1
50 TABLET ORAL DAILY
Qty: 100 TABLET | Refills: 0 | Status: SHIPPED | OUTPATIENT
Start: 2024-08-07

## 2024-09-13 DIAGNOSIS — E78.2 MIXED HYPERLIPIDEMIA: Primary | ICD-10-CM

## 2024-09-15 DIAGNOSIS — E55.9 VITAMIN D DEFICIENCY: Primary | ICD-10-CM

## 2024-10-02 ENCOUNTER — APPOINTMENT (OUTPATIENT)
Age: 59
End: 2024-10-02
Payer: COMMERCIAL

## 2024-10-02 DIAGNOSIS — N95.1 SYMPTOMATIC MENOPAUSAL OR FEMALE CLIMACTERIC STATES: ICD-10-CM

## 2024-10-02 DIAGNOSIS — R68.82 DECREASED LIBIDO: ICD-10-CM

## 2024-10-02 DIAGNOSIS — E03.9 MYXEDEMA HEART DISEASE: ICD-10-CM

## 2024-10-02 DIAGNOSIS — F41.9 ANXIETY DISORDER OF CHILDHOOD OR ADOLESCENCE: ICD-10-CM

## 2024-10-02 DIAGNOSIS — G47.00 PERSISTENT DISORDER OF INITIATING OR MAINTAINING SLEEP: ICD-10-CM

## 2024-10-02 DIAGNOSIS — R73.03 DIABETES MELLITUS, LATENT: ICD-10-CM

## 2024-10-02 DIAGNOSIS — I51.9 MYXEDEMA HEART DISEASE: ICD-10-CM

## 2024-10-02 DIAGNOSIS — F32.A DEPRESSIVE TYPE PSYCHOSIS: ICD-10-CM

## 2024-10-02 DIAGNOSIS — R45.4 IRRITABLE MOOD: ICD-10-CM

## 2024-10-02 DIAGNOSIS — E66.3 SEVERELY OVERWEIGHT: ICD-10-CM

## 2024-10-02 DIAGNOSIS — R45.86 BRIEF COMPENSATORY MOOD SWINGS: ICD-10-CM

## 2024-10-02 LAB
ALBUMIN SERPL BCG-MCNC: 3.8 G/DL (ref 3.5–5)
ALP SERPL-CCNC: 90 U/L (ref 34–104)
ALT SERPL W P-5'-P-CCNC: 24 U/L (ref 7–52)
ANION GAP SERPL CALCULATED.3IONS-SCNC: 9 MMOL/L (ref 4–13)
AST SERPL W P-5'-P-CCNC: 17 U/L (ref 13–39)
BILIRUB SERPL-MCNC: 0.88 MG/DL (ref 0.2–1)
BUN SERPL-MCNC: 13 MG/DL (ref 5–25)
CALCIUM SERPL-MCNC: 9.2 MG/DL (ref 8.4–10.2)
CHLORIDE SERPL-SCNC: 103 MMOL/L (ref 96–108)
CO2 SERPL-SCNC: 26 MMOL/L (ref 21–32)
CREAT SERPL-MCNC: 0.65 MG/DL (ref 0.6–1.3)
EST. AVERAGE GLUCOSE BLD GHB EST-MCNC: 117 MG/DL
GFR SERPL CREATININE-BSD FRML MDRD: 98 ML/MIN/1.73SQ M
GLUCOSE P FAST SERPL-MCNC: 125 MG/DL (ref 65–99)
HBA1C MFR BLD: 5.7 %
INSULIN SERPL-ACNC: 21.12 UIU/ML (ref 1.9–23)
POTASSIUM SERPL-SCNC: 4.1 MMOL/L (ref 3.5–5.3)
PROT SERPL-MCNC: 7.2 G/DL (ref 6.4–8.4)
SODIUM SERPL-SCNC: 138 MMOL/L (ref 135–147)

## 2024-10-02 PROCEDURE — 80053 COMPREHEN METABOLIC PANEL: CPT

## 2024-10-02 PROCEDURE — 36415 COLL VENOUS BLD VENIPUNCTURE: CPT

## 2024-10-02 PROCEDURE — 83525 ASSAY OF INSULIN: CPT

## 2024-10-02 PROCEDURE — 83036 HEMOGLOBIN GLYCOSYLATED A1C: CPT

## 2024-10-09 ENCOUNTER — OFFICE VISIT (OUTPATIENT)
Dept: FAMILY MEDICINE CLINIC | Facility: CLINIC | Age: 59
End: 2024-10-09
Payer: COMMERCIAL

## 2024-10-09 ENCOUNTER — APPOINTMENT (OUTPATIENT)
Dept: RADIOLOGY | Facility: CLINIC | Age: 59
End: 2024-10-09
Payer: COMMERCIAL

## 2024-10-09 VITALS
HEART RATE: 102 BPM | BODY MASS INDEX: 34.87 KG/M2 | WEIGHT: 196.8 LBS | HEIGHT: 63 IN | TEMPERATURE: 97.5 F | DIASTOLIC BLOOD PRESSURE: 78 MMHG | OXYGEN SATURATION: 96 % | SYSTOLIC BLOOD PRESSURE: 124 MMHG

## 2024-10-09 DIAGNOSIS — F51.01 PRIMARY INSOMNIA: ICD-10-CM

## 2024-10-09 DIAGNOSIS — E66.09 CLASS 1 OBESITY DUE TO EXCESS CALORIES WITH BODY MASS INDEX (BMI) OF 34.0 TO 34.9 IN ADULT, UNSPECIFIED WHETHER SERIOUS COMORBIDITY PRESENT: ICD-10-CM

## 2024-10-09 DIAGNOSIS — M54.50 LOW BACK PAIN RADIATING TO RIGHT LEG: ICD-10-CM

## 2024-10-09 DIAGNOSIS — M79.604 LOW BACK PAIN RADIATING TO RIGHT LEG: ICD-10-CM

## 2024-10-09 DIAGNOSIS — R10.10 UPPER ABDOMINAL PAIN: ICD-10-CM

## 2024-10-09 DIAGNOSIS — R73.9 ELEVATED BLOOD SUGAR: ICD-10-CM

## 2024-10-09 DIAGNOSIS — E66.811 CLASS 1 OBESITY DUE TO EXCESS CALORIES WITH BODY MASS INDEX (BMI) OF 34.0 TO 34.9 IN ADULT, UNSPECIFIED WHETHER SERIOUS COMORBIDITY PRESENT: ICD-10-CM

## 2024-10-09 DIAGNOSIS — R06.83 SNORING: ICD-10-CM

## 2024-10-09 DIAGNOSIS — M25.551 RIGHT HIP PAIN: ICD-10-CM

## 2024-10-09 DIAGNOSIS — E78.2 MIXED HYPERLIPIDEMIA: ICD-10-CM

## 2024-10-09 DIAGNOSIS — D58.2 ELEVATED HEMOGLOBIN (HCC): ICD-10-CM

## 2024-10-09 DIAGNOSIS — Z00.00 ANNUAL PHYSICAL EXAM: Primary | ICD-10-CM

## 2024-10-09 DIAGNOSIS — E03.9 ACQUIRED HYPOTHYROIDISM: ICD-10-CM

## 2024-10-09 DIAGNOSIS — G47.19 EXCESSIVE DAYTIME SLEEPINESS: ICD-10-CM

## 2024-10-09 DIAGNOSIS — M54.50 ACUTE BILATERAL LOW BACK PAIN WITHOUT SCIATICA: ICD-10-CM

## 2024-10-09 PROCEDURE — 73502 X-RAY EXAM HIP UNI 2-3 VIEWS: CPT

## 2024-10-09 PROCEDURE — 72110 X-RAY EXAM L-2 SPINE 4/>VWS: CPT

## 2024-10-09 PROCEDURE — 99396 PREV VISIT EST AGE 40-64: CPT | Performed by: FAMILY MEDICINE

## 2024-10-09 RX ORDER — FEZOLINETANT 45 MG/1
TABLET, FILM COATED ORAL
COMMUNITY
Start: 2024-08-01

## 2024-10-09 RX ORDER — TRAZODONE HYDROCHLORIDE 50 MG/1
TABLET, FILM COATED ORAL
Qty: 30 TABLET | Refills: 0 | Status: SHIPPED | OUTPATIENT
Start: 2024-10-09

## 2024-10-09 RX ORDER — CYCLOBENZAPRINE HCL 5 MG
5 TABLET ORAL 3 TIMES DAILY PRN
Qty: 90 TABLET | Refills: 0 | Status: SHIPPED | OUTPATIENT
Start: 2024-10-09

## 2024-10-09 NOTE — PROGRESS NOTES
Adult Annual Physical  Name: Akila Loera      : 1965      MRN: [id number]  Encounter Provider: Lea Yoon DO  Encounter Date: 10/9/2024   Encounter department: [facility]    Assessment & Plan  Annual physical exam         Acquired hypothyroidism  Last TSH 2.6.         Elevated blood sugar   and A1C is .7         Mixed hyperlipidemia  Cholesterol much improved at 208.         Primary insomnia  Willing to try something for sleep.  Orders:    traZODone (DESYREL) 50 mg tablet; Take one half to one tablet at bedtime.    Excessive daytime sleepiness    Orders:    Ambulatory Referral to Sleep Medicine; Future    Snoring    Orders:    Ambulatory Referral to Sleep Medicine; Future    Elevated hemoglobin (HCC)    Orders:    Ambulatory Referral to Sleep Medicine; Future    Class 1 obesity due to excess calories with body mass index (BMI) of 34.0 to 34.9 in adult, unspecified whether serious comorbidity present           Right hip pain         Low back pain radiating to right leg    Orders:    XR spine lumbar minimum 4 views non injury; Future    Acute bilateral low back pain without sciatica    Orders:    cyclobenzaprine (FLEXERIL) 5 mg tablet; Take 1 tablet (5 mg total) by mouth 3 (three) times a day as needed for muscle spasms    Upper abdominal pain    Orders:    US abdomen complete; Future    Immunizations and preventive care screenings were discussed with patient today. Appropriate education was printed on patient's after visit summary.    Counseling:  Alcohol/drug use: discussed moderation in alcohol intake, the recommendations for healthy alcohol use, and avoidance of illicit drug use.  Dental Health: discussed importance of regular tooth brushing, flossing, and dental visits.  Exercise: the importance of regular exercise/physical activity was discussed. Recommend exercise 3-5 times per week for at least 30 minutes.       Depression Screening and Follow-up Plan: Patient was  "screened for depression during today's encounter. They screened negative with a PHQ-2 score of 0.        History of Present Illness     Adult Annual Physical:  Patient presents for annual physical. In addition to physical, she is following up on thyroid. Not sleeping - gets up frequently to urinate. Also right hip pain.  She is seeing, Dr. Lovell .     Diet and Physical Activity:  - Diet/Nutrition: well balanced diet and limited junk food.  - Exercise: 5-7 times a week on average.    Depression Screening:  - PHQ-2 Score: 0    General Health:  - Sleep: sleeps poorly.  - Hearing: normal hearing bilateral ears.  - Vision: goes for regular eye exams.  - Dental: regular dental visits.    /GYN Health:  - Follows with GYN: yes.   - Menopause: postmenopausal.     Advanced Care Planning:  - Has an advanced directive?: no    - Has a durable medical POA?: no      Review of Systems   Constitutional:  Negative for chills and fever.   HENT:  Negative for congestion and sore throat.    Respiratory:  Negative for chest tightness.    Cardiovascular:  Negative for chest pain and palpitations.   Gastrointestinal:  Negative for abdominal pain, constipation, diarrhea and nausea.   Genitourinary:  Positive for frequency.   Musculoskeletal:  Positive for arthralgias.   Skin: Negative.    Neurological:  Positive for headaches (improved until last month). Negative for dizziness.   Psychiatric/Behavioral: Negative.           Objective     /78 (BP Location: Left arm, Patient Position: Sitting)   Pulse 102   Temp 97.5 °F (36.4 °C)   Ht 5' 3\" (1.6 m)   Wt 89.3 kg (196 lb 12.8 oz)   LMP 03/13/2018   SpO2 96%   BMI 34.86 kg/m²     Physical Exam  Vitals and nursing note reviewed.   Constitutional:       General: She is not in acute distress.     Appearance: She is well-developed. She is obese.   HENT:      Head: Normocephalic.      Right Ear: Tympanic membrane normal.      Left Ear: Tympanic membrane normal.      Mouth/Throat:      " Pharynx: No posterior oropharyngeal erythema.   Eyes:      General: No scleral icterus.  Neck:      Thyroid: No thyromegaly.      Vascular: No carotid bruit.   Cardiovascular:      Rate and Rhythm: Normal rate and regular rhythm.      Heart sounds: Normal heart sounds. No murmur heard.  Pulmonary:      Effort: Pulmonary effort is normal.      Breath sounds: Normal breath sounds.   Abdominal:      General: Bowel sounds are normal.      Palpations: Abdomen is soft.   Musculoskeletal:      Right lower leg: No edema.      Left lower leg: No edema.   Lymphadenopathy:      Cervical: No cervical adenopathy.   Skin:     General: Skin is warm and dry.   Neurological:      Mental Status: She is alert and oriented to person, place, and time.   Psychiatric:         Mood and Affect: Mood normal.

## 2024-10-14 ENCOUNTER — TRANSCRIBE ORDERS (OUTPATIENT)
Dept: SLEEP CENTER | Facility: CLINIC | Age: 59
End: 2024-10-14

## 2024-10-14 DIAGNOSIS — G47.19 EXCESSIVE DAYTIME SLEEPINESS: Primary | ICD-10-CM

## 2024-10-14 DIAGNOSIS — R06.83 SNORING: ICD-10-CM

## 2024-10-14 DIAGNOSIS — D58.2 ELEVATED HEMOGLOBIN (HCC): ICD-10-CM

## 2024-11-11 DIAGNOSIS — M25.551 RIGHT HIP PAIN: Primary | ICD-10-CM

## 2024-11-12 ENCOUNTER — HOSPITAL ENCOUNTER (OUTPATIENT)
Dept: ULTRASOUND IMAGING | Facility: MEDICAL CENTER | Age: 59
Discharge: HOME | End: 2024-11-12
Payer: COMMERCIAL

## 2024-11-12 DIAGNOSIS — R10.10 UPPER ABDOMINAL PAIN: ICD-10-CM

## 2024-11-12 PROCEDURE — 76700 US EXAM ABDOM COMPLETE: CPT

## 2024-11-13 DIAGNOSIS — E03.9 HYPOTHYROIDISM, UNSPECIFIED TYPE: ICD-10-CM

## 2024-11-13 RX ORDER — LEVOTHYROXINE SODIUM 50 UG/1
50 TABLET ORAL DAILY
Qty: 100 TABLET | Refills: 1 | Status: SHIPPED | OUTPATIENT
Start: 2024-11-13

## 2024-11-18 ENCOUNTER — RESULTS FOLLOW-UP (OUTPATIENT)
Dept: FAMILY MEDICINE CLINIC | Facility: CLINIC | Age: 59
End: 2024-11-18

## 2024-12-04 ENCOUNTER — EVALUATION (OUTPATIENT)
Age: 59
End: 2024-12-04
Payer: COMMERCIAL

## 2024-12-04 DIAGNOSIS — M25.551 RIGHT HIP PAIN: ICD-10-CM

## 2024-12-04 PROCEDURE — 97110 THERAPEUTIC EXERCISES: CPT | Performed by: PHYSICAL THERAPIST

## 2024-12-04 PROCEDURE — 97161 PT EVAL LOW COMPLEX 20 MIN: CPT | Performed by: PHYSICAL THERAPIST

## 2024-12-04 NOTE — PROGRESS NOTES
PT Evaluation     Today's date: 2024  Patient name: Akila Loera  : 1965  MRN: [id number]  Referring provider: Lea Yoon DO  Dx:   Encounter Diagnosis     ICD-10-CM    1. Right hip pain  M25.551 Ambulatory Referral to Physical Therapy                     Assessment  Impairments: abnormal or restricted ROM, activity intolerance, impaired physical strength, lacks appropriate home exercise program, pain with function and poor posture   Symptom irritability: low    Assessment details: Akila Loera is a 59 y.o. female who presents to physical therapy with diagnosis of right hip pain . Akila presents with pain and limitations in range of motion, strength, joint mobility, flexibility, and functional ability. The current limitations are affecting Akila's ability to function at prior level. She will benefit from skilled physical therapy to address the current impairments and functional limitations to enable her to return to daily activities at maximal level. Thank you for the referral.    Understanding of Dx/Px/POC: good     Prognosis: good    Goals  STG  Patient will decrease pain at worst to 5/10  Patient will improve R hip IR/ER 5 degrees to improve hip mobility  Patient will be independent with basic HEP    LTG  Patient will decrease pain at worst to 0-2/10  Patient will improve LE strength 1/3 grade in all deficit planes  Patient will report ability to stand after prolonged sitting with less or no pain  Patient will improve FOTO greater than or equal to projected score  Patient will be independent with comprehensive HEP    Plan  Patient would benefit from: skilled physical therapy  Planned modality interventions: cryotherapy and thermotherapy: hydrocollator packs    Planned therapy interventions: manual therapy, neuromuscular re-education, patient education, therapeutic activities, therapeutic exercise, therapeutic training and home exercise program    Frequency: 2x week  Duration in weeks:  6  Plan of Care beginning date: 2024  Plan of Care expiration date: 1/15/2025  Treatment plan discussed with: patient        Subjective Evaluation    History of Present Illness  Mechanism of injury: Akila is a 59 y.o. female presenting to physical therapy on 24 with primary complaints of right hip pain. She states it has been going on for a few years, but the past year it has gotten worse. She had an xay in 2019 which came back normal but her xray this year showed mild arthritis. She mentions the pain doesn't stop her from doing things but she is noticing pain for often. She has pain when getting up after prolonged sitting and pain with prolonged walking. She is able to complete ADLs and household tasks. She does cardio and strength training. She has pain with ascending the steps when bringing her right leg up, but she can complete in a reciprocal pattern most of the time. She also notes having sciatic pain on and off 1-2x a week.    Patient Goals  Patient goals for therapy: decreased pain, independence with ADLs/IADLs, increased strength and return to sport/leisure activities  Patient goal: be able to stand up with less pain.  Pain  Current pain ratin  At best pain ratin  At worst pain rating: 10  Location: Right hip  Quality: throbbing and dull ache  Relieving factors: rest (when sitting prolonged taking a few steps, when walking too long sitting for a few minutes)  Aggravating factors: sitting    Social Support  Steps to enter house: yes (1-2)  Stairs in house: yes (8)     Employment status: working    Diagnostic Tests    FCE comments: 10/9/24 FINDINGS: R HIP XRAY     There is no acute fracture or dislocation.  Mild osteophytosis in the right hip. The left hip is unremarkable  No lytic or blastic osseous lesion.  Soft tissues are unremarkable.  The visualized lumbar spine is unremarkable.     IMPRESSION:     Mild right hip degenerative changes.       Treatments  No previous or current  "treatments        Objective    Posture: fair    Palpation: tenderness noted along right piriformis, glute med, IT Band    Lumbar AROM:  Flexion: WFL \"with stretch\"  Extension: WFL \"discomfort in L low back\"  R Rotation: WFL  L Rotation: WFL  R Side Bending: WFL  L Side Bending: WFL    Special Tests: Positive Sciatic Nerve right      Right Hip AROM  Flexion: 90 deg  Internal Rotation (90/90) : 14 deg  External Rotation (90/90) : 10 deg  Abduction: 20 deg  Extension: 0 deg    Left Hip AROM  Flexion: 105 deg  Internal Rotation (90/90) : 23 deg  External Rotation (90/90) : 18 deg  Abduction: 25 deg  Extension: 5 deg    Lower Extremity Strength  Right   Hip Flexion: 5/5   Hip Extension: 4/5  Hip Abduction: 4/5   Hip IR (90/90): 4/5  Hip ER (90/90): 4/5  Knee Extension: 5/5   Knee Flexion: 5/5   Ankle DF: 5/5     Left  Hip Flexion: 5/5   Hip Extension: 4+/5  Hip Abduction: 4+/5   Hip IR (90/90): 5/5  Hip ER (90/90): 4+/5  Knee Extension: 5/5   Knee Flexion: 5/5   Ankle DF: 5/5       Precautions: n/a      Manuals 12/4            Piriformis str             MWM Hip IR/ER, Flex                                       Neuro Re-Ed             R Sciatic N [place] Seated HEP            TA Brace             TA + March             TA + Hip Add Iso                                                    Ther Ex             Nustep             Supine Piriformis Str HEP            Clamshell HEP            Bridge             Reverse Clam             Sidelying hip Abd                                       Ther Activity             Step Up Fwd                                       Gait Training                                       Modalities                                            "

## 2024-12-09 ENCOUNTER — OFFICE VISIT (OUTPATIENT)
Age: 59
End: 2024-12-09
Payer: COMMERCIAL

## 2024-12-09 DIAGNOSIS — M25.551 RIGHT HIP PAIN: Primary | ICD-10-CM

## 2024-12-09 PROCEDURE — 97110 THERAPEUTIC EXERCISES: CPT | Performed by: PHYSICAL THERAPIST

## 2024-12-09 PROCEDURE — 97112 NEUROMUSCULAR REEDUCATION: CPT | Performed by: PHYSICAL THERAPIST

## 2024-12-09 PROCEDURE — 97140 MANUAL THERAPY 1/> REGIONS: CPT | Performed by: PHYSICAL THERAPIST

## 2024-12-09 NOTE — PROGRESS NOTES
"Daily Note     Today's date: 2024  Patient name: Akila Loera  : 1965  MRN: [id number]  Referring provider: Lea Yoon DO  Dx:   Encounter Diagnosis     ICD-10-CM    1. Right hip pain  M25.551                      Subjective: patient offers no new complaints. No increased symptoms since IE.      Objective: See treatment diary below      Assessment: Tolerated treatment well. Pt performed Nustep aerobic exercise to increase blood flow to the area being treated, prepare the muscles for strength training and stretching, improve overall tolerance to activity, and aerobic endurance. Initiated program as noted below. Updated HEP to include TA brace, Reverse Clamshell, Sidelying Hip Abduction, and Bridges. Patient exhibited good technique with therapeutic exercises and would benefit from continued PT      Plan: Continue per plan of care.      Precautions: n/a      Manuals            Piriformis str  SK           MWM Hip IR/ER, Flex  SK           Lat Hip Distraction with belt  SK                        Neuro Re-Ed             R Sciatic N Glide Seated HEP x10           TA Brace  5\"x15           TA + March  2x10           TA + Hip Add Iso  5\"x20                                                  Ther Ex             Nustep  6' L1           Supine Piriformis Str HEP 30\"x3           Clamshell HEP 2x10           Bridge  5\"x15           Reverse Clam  2x10           Sidelying hip Abd  2x10                                     Ther Activity             Step Up Fwd  4\"x10           Step Up Lat  4\"x10                        Gait Training                                       Modalities                                            "

## 2024-12-17 ENCOUNTER — OFFICE VISIT (OUTPATIENT)
Age: 59
End: 2024-12-17
Payer: COMMERCIAL

## 2024-12-17 DIAGNOSIS — M25.551 RIGHT HIP PAIN: Primary | ICD-10-CM

## 2024-12-17 PROCEDURE — 97110 THERAPEUTIC EXERCISES: CPT

## 2024-12-17 PROCEDURE — 97140 MANUAL THERAPY 1/> REGIONS: CPT

## 2024-12-17 PROCEDURE — 97112 NEUROMUSCULAR REEDUCATION: CPT

## 2024-12-17 PROCEDURE — 97530 THERAPEUTIC ACTIVITIES: CPT

## 2024-12-17 NOTE — PROGRESS NOTES
"Daily Note     Today's date: 2024  Patient name: Akila Loera  : 1965  MRN: [id number]  Referring provider: Lea Yoon DO  Dx:   Encounter Diagnosis     ICD-10-CM    1. Right hip pain  M25.551           Start Time: 732  Stop Time: 820  Total time in clinic (min): 48 minutes    Subjective: Pt reports she has mild discomfort in the R piriformis today and notes mild stiffness. Pt denies any radicular symptoms today. Pt reports following last session she had no significant changes in symptoms. Pt reports compliance with HEP and denies any questions or concerns at this time.      Objective: See treatment diary below      Assessment: Pt performed Nustep aerobic exercises to increase blood flow to the area being treated, prepare the muscles for strength training and stretching, improve overall tolerance to activity, and aerobic endurance. Pt did well with session as outlined without any adverse reactions. Pt required minimal cueing from therapist to correct hip position/ form. Pt continues to benefit from stretching and manual work at the end of the session to manage R hip discomfort and improve mobility. Introduced seated piriformis stretch today to allow pt the ability to stretch throughout the work day. Pt continues to benefit from physical therapy in order to continue progressing towards goals as outlined and return to PLOF. Will continue to modify and advance current POC based on overall progress and symptom irritability.       Plan: Continue per plan of care.  Progress treatment as tolerated.       Precautions: n/a      Manuals           Piriformis str  SK RR          MWM Hip IR/ER, Flex  SK           Lat Hip Distraction with belt  SK RR          Hip distraction/ post mob   RR                       Neuro Re-Ed             R Sciatic N Glide Seated HEP x10           TA Brace  5\"x15 5\"x15          TA + March  2x10 10x5\" holds ea b/l          TA + Hip Add Iso  5\"x20 5\"x20                " "                                 Ther Ex             Nustep  6' L1 10' L2          Supine Piriformis Str HEP 30\"x3 Seated 3x30\" holds ea           Clamshell HEP 2x10 x20          Bridge  5\"x15 5\"x15          Reverse Clam  2x10 x20          Sidelying hip Abd  2x10 x20                                    Ther Activity             Step Up Fwd  4\"x10 4\"x10 > 6\" x10           Step Up Lat  4\"x10 6\"x10                       Gait Training                                       Modalities                                              "

## 2024-12-23 ENCOUNTER — OFFICE VISIT (OUTPATIENT)
Age: 59
End: 2024-12-23
Payer: COMMERCIAL

## 2024-12-23 DIAGNOSIS — M25.551 RIGHT HIP PAIN: Primary | ICD-10-CM

## 2024-12-23 PROCEDURE — 97140 MANUAL THERAPY 1/> REGIONS: CPT | Performed by: PHYSICAL THERAPIST

## 2024-12-23 PROCEDURE — 97112 NEUROMUSCULAR REEDUCATION: CPT | Performed by: PHYSICAL THERAPIST

## 2024-12-23 PROCEDURE — 97110 THERAPEUTIC EXERCISES: CPT | Performed by: PHYSICAL THERAPIST

## 2024-12-23 PROCEDURE — 97530 THERAPEUTIC ACTIVITIES: CPT | Performed by: PHYSICAL THERAPIST

## 2024-12-23 NOTE — PROGRESS NOTES
"Daily Note     Today's date: 2024  Patient name: Akila Loera  : 1965  MRN: [id number]  Referring provider: Lea Yoon DO  Dx:   Encounter Diagnosis     ICD-10-CM    1. Right hip pain  M25.551           Start Time: 1715  Stop Time: 1800  Total time in clinic (min): 45 minutes    Subjective: Pain range 1-8/10. 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 Pt reports LBP with radiating pain to lateral R LE to mid calf.       Objective: See treatment diary below      Assessment: . Patient performed Nustep aerobic exercise to increase blood flow to the area being treated, prepare the muscles for strength training and stretching, improve overall tolerance to activity, and aerobic endurance.  Pain centralized In R LE with supine position following scott traction today. Pt tolerated all ex with minimal discomfort.  Plan to continue PT with goal of increasing activity level with minimal pain.  Plan: Progress treatment as tolerated.       Precautions: n/a      Manuals          Piriformis str  SK RR CRR         MWM Hip IR/ER, Flex  SK           Lat Hip Distraction with belt  SK RR CRR         Hip distraction/ post mob   RR          Scott L-tx with belt    CRR         Neuro Re-Ed             R Sciatic N Glide Seated HEP x10           TA Brace  5\"x15 5\"x15 5\"  x 15         TA + March  2x10 10x5\" holds ea b/l 15x         TA + Hip Add Iso  5\"x20 5\"x20 20x   Hold 5\"         LTR    5x L/R 10ld 5\"                                   Ther Ex             Nustep  6' L1 10' L2 10' L4         Supine Piriformis Str HEP 30\"x3 Seated 3x30\" holds ea  3 x 30\" L/R         Clamshell HEP 2x10 x20 20xL/R         Bridge  5\"x15 5\"x15 5\"  20x         Reverse Clam  2x10 x20 20x L/R         Sidelying hip Abd  2x10 x20 20x L/R                                   Ther Activity             Step Up Fwd  4\"x10 4\"x10 > 6\" x10  1-x 4\" L/R  Pain R             Step Up Lat  4\"x10 6\"x10 10x 4\"    L/R                  "     Gait Training                                       Modalities

## 2024-12-30 ENCOUNTER — OFFICE VISIT (OUTPATIENT)
Age: 59
End: 2024-12-30
Payer: COMMERCIAL

## 2024-12-30 DIAGNOSIS — M25.551 RIGHT HIP PAIN: Primary | ICD-10-CM

## 2024-12-30 PROCEDURE — 97112 NEUROMUSCULAR REEDUCATION: CPT | Performed by: PHYSICAL THERAPIST

## 2024-12-30 PROCEDURE — 97530 THERAPEUTIC ACTIVITIES: CPT | Performed by: PHYSICAL THERAPIST

## 2024-12-30 PROCEDURE — 97140 MANUAL THERAPY 1/> REGIONS: CPT | Performed by: PHYSICAL THERAPIST

## 2024-12-30 PROCEDURE — 97110 THERAPEUTIC EXERCISES: CPT | Performed by: PHYSICAL THERAPIST

## 2024-12-30 NOTE — PROGRESS NOTES
"Daily Note     Today's date: 2024  Patient name: Akila Loera  : 1965  MRN: [id number]  Referring provider: Lea Yoon DO  Dx:   Encounter Diagnosis     ICD-10-CM    1. Right hip pain  M25.551                      Subjective: patient reports she feels like she is back to baseline from where she was last week when the pain significantly increased due to holiday tasks.      Objective: See treatment diary below      Assessment: Tolerated treatment well. Pt performed nustep aerobic exercise to increase blood flow to the area being treated, prepare the muscles for strength training and stretching, improve overall tolerance to activity, and aerobic endurance. Added standing hip 3 way today. No reports of increased symptoms noted throughout the session. Patient exhibited good technique with therapeutic exercises and would benefit from continued PT      Plan: Continue per plan of care.      Precautions: n/a      Manuals         Piriformis str  SK RR CRR         MWM Hip IR/ER, Flex  SK           Lat Hip Distraction with belt  SK RR CRR SK         Hip distraction/ post mob   RR  SK        Scott L-tx with belt    CRR         Neuro Re-Ed             R Sciatic N Glide Seated HEP x10           TA Brace  5\"x15 5\"x15 5\"x15 5\"x20        TA + March  2x10 10x5\" holds ea b/l 15x x15 ea        TA + Hip Add Iso  5\"x20 5\"x20 20x   Hold 5\" 5\"x20        LTR    5x L/R 10ld 5\" 5\" x10 ea        Standing hip 3 way     10x ea bilat                                  Ther Ex             Nustep  6' L1 10' L2 10' L4 10' L4        Supine Piriformis Str HEP 30\"x3 Seated 3x30\" holds ea  3 x 30\" L/R Seated 30\"x3 ea        Clamshell HEP 2x10 x20 20xL/R 20x ea        Bridge  5\"x15 5\"x15 5\"  20x 5\" 2x10        Reverse Clam  2x10 x20 20x L/R 20x ea        Sidelying hip Abd  2x10 x20 20x L/R 20x ea                                               Ther Activity             Step Up Fwd  4\"x10 4\"x10 > 6\" x10  1-x 4\" " "L/R  Pain R     4\" x10 ea        Step Up Lat  4\"x10 6\"x10 10x 4\"    L/R 4\"x10 ea                     Gait Training                                       Modalities                                                  "

## 2025-01-06 ENCOUNTER — APPOINTMENT (OUTPATIENT)
Age: 60
End: 2025-01-06
Payer: COMMERCIAL

## 2025-01-07 ENCOUNTER — APPOINTMENT (OUTPATIENT)
Age: 60
End: 2025-01-07
Payer: COMMERCIAL

## 2025-01-15 ENCOUNTER — OFFICE VISIT (OUTPATIENT)
Age: 60
End: 2025-01-15
Payer: COMMERCIAL

## 2025-01-15 DIAGNOSIS — M25.551 RIGHT HIP PAIN: Primary | ICD-10-CM

## 2025-01-15 PROCEDURE — 97530 THERAPEUTIC ACTIVITIES: CPT | Performed by: PHYSICAL THERAPIST

## 2025-01-15 PROCEDURE — 97110 THERAPEUTIC EXERCISES: CPT | Performed by: PHYSICAL THERAPIST

## 2025-01-15 PROCEDURE — 97140 MANUAL THERAPY 1/> REGIONS: CPT | Performed by: PHYSICAL THERAPIST

## 2025-01-15 PROCEDURE — 97112 NEUROMUSCULAR REEDUCATION: CPT | Performed by: PHYSICAL THERAPIST

## 2025-01-15 NOTE — PROGRESS NOTES
"Daily Note     Today's date: 1/15/2025  Patient name: Akila Loera  : 1965  MRN: [id number]  Referring provider: Lea Yoon DO  Dx:   Encounter Diagnosis     ICD-10-CM    1. Right hip pain  M25.551                      Subjective: patient reports she is still having some pain with various tasks. She mentions that she is challenged with SL deadlift due to pain during her home resistance training program. is doing strength training 3x a week cardio 2x a week walking 3x a day and doing her PT exercises daily.       Objective: See treatment diary below      Assessment: Tolerated treatment well. Pt performed nustep aerobic exercise to increase blood flow to the area being treated, prepare the muscles for strength training and stretching, improve overall tolerance to activity, and aerobic endurance. She is challenged with standing hip 3 way especially when standing on R leg. Discussed her current workout/cardio regime. Discussed overworking the muscles and allowing them to recover more while going through rehab also. Lowered PT exercises to every other day and strength training to 2x per week. She demonstrates understanding, assess response nv. Patient demonstrated fatigue post treatment, exhibited good technique with therapeutic exercises, and would benefit from continued PT      Plan: Continue per plan of care.  Progress treatment as tolerated.         Precautions: n/a      Manuals 12/4 12/9 12/17 12/23 12/30 1/15       Piriformis str  SK RR CRR         MWM Hip IR/ER, Flex  SK    SK       Lat Hip Distraction with belt  SK RR CRR SK         Hip distraction/ post mob   RR  SK        Scott L-tx with belt    CRR         ITB Stretch      SK                    Neuro Re-Ed             R Sciatic N Glide Seated HEP x10           TA Brace  5\"x15 5\"x15 5\"x15 5\"x20        TA + March  2x10 10x5\" holds ea b/l 15x x15 ea        TA + Hip Add Iso  5\"x20 5\"x20 20x   Hold 5\" 5\"x20        LTR    5x L/R 10ld 5\" 5\" x10 ea      " "  Standing hip 3 way     10x ea bilat 10x ea bilat                    Pt Edu      SK                    Ther Ex             Nustep  6' L1 10' L2 10' L4 10' L4 10' L4       Supine Piriformis Str HEP 30\"x3 Seated 3x30\" holds ea  3 x 30\" L/R Seated 30\"x3 ea Seated 30\"x3 ea       Clamshell HEP 2x10 x20 20xL/R 20x ea 30x ea       Bridge  5\"x15 5\"x15 5\"  20x 5\" 2x10 5\" 3x10       Reverse Clam  2x10 x20 20x L/R 20x ea 30x ea       Sidelying hip Abd  2x10 x20 20x L/R 20x ea 20x ea                                              Ther Activity             Step Up Fwd  4\"x10 4\"x10 > 6\" x10  1-x 4\" L/R  Pain R     4\" x10 ea 6\"  X10 ea       Step Up Lat  4\"x10 6\"x10 10x 4\"    L/R 4\"x10 ea 4\" x10 ea        Step Down Fwd      4\" x10 ea       Gait Training                                       Modalities                                                    "

## 2025-01-21 ENCOUNTER — OFFICE VISIT (OUTPATIENT)
Age: 60
End: 2025-01-21
Payer: COMMERCIAL

## 2025-01-21 DIAGNOSIS — M25.551 RIGHT HIP PAIN: Primary | ICD-10-CM

## 2025-01-21 PROCEDURE — 97112 NEUROMUSCULAR REEDUCATION: CPT

## 2025-01-21 PROCEDURE — 97110 THERAPEUTIC EXERCISES: CPT

## 2025-01-21 PROCEDURE — 97164 PT RE-EVAL EST PLAN CARE: CPT

## 2025-01-21 NOTE — PROGRESS NOTES
PT Re-Evaluation     Today's date: 2025  Patient name: Akila Loera  : 1965  MRN: [id number]  Referring provider: Lea Yoon DO  Dx:   Encounter Diagnosis     ICD-10-CM    1. Right hip pain  M25.551             Start Time: 0815  Stop Time: 0900  Total time in clinic (min): 45 minutes    Assessment  Impairments: abnormal or restricted ROM, activity intolerance, impaired physical strength, pain with function, poor posture , participation limitations and activity limitations  Symptom irritability: low    Assessment details: Aklia Loera is a 59 y.o. female who initially presented to physical therapy with diagnosis of right hip pain. Upon further assessment today pt had excessive tightness of R hip flexors, ITB, and piriformis. Following introduction to manual and self stretching pt had mild improvements in symptoms. Pt has had mild improvements in ROM and strength since last session but continues to have discomfort into most planes of motion noted at end range. The current limitations are affecting Akila's ability to function at prior level. Provided pt with updated HEP and encouraged her to stretch throughout her day. She will benefit from continued skilled physical therapy to address the current impairments and functional limitations to enable her to return to daily activities at maximal level. Pt recommended for 1-2x a week for 2-4 weeks. Due to minimal improvements to date pt reports she will likely discharge after her next scheduled visit. Pt prognosis for therapy is good. PT discussed POC and goals with patient, patient was agreeable.          Physical therapist assistant (PTA) may be utilized to administer treatments as appropriate and in accordance with [facility] Physical Therapy Practice Act.      Understanding of Dx/Px/POC: good     Prognosis: good    Goals  STG  Patient will decrease pain at worst to 5/10 (in progress)  Patient will improve R hip IR/ER 5 degrees to improve  hip mobility (MET)  Patient will be independent with basic HEP (MET)    LTG  Patient will decrease pain at worst to 0-2/10 (in progress)  Patient will improve LE strength 1/3 grade in all deficit planes (in progress)  Patient will report ability to stand after prolonged sitting with less or no pain (in progress)  Patient will improve FOTO greater than or equal to projected score (in progress)  Patient will be independent with comprehensive HEP (MET)    New Goals as of 1/21/25 to be met in 2-4 weeks or upon discharge from OPPT:  1. Pt will be able to perform 10 squats without increase in symptoms.  2. Pt will be able to perform 10 dead lifts without increase in symptoms.  3. Pt will be able to perform SLB on the R LE for 15-30 seconds without increase in pain.      Plan  Patient would benefit from: skilled physical therapy and PT eval  Planned modality interventions: cryotherapy and thermotherapy: hydrocollator packs    Planned therapy interventions: manual therapy, neuromuscular re-education, patient education, therapeutic activities, therapeutic exercise, therapeutic training, home exercise program, activity modification, balance, balance/weight bearing training, flexibility, functional ROM exercises, stretching, strengthening, patient/caregiver education, IASTM, kinesiology taping and nerve gliding    Frequency: 2x week (1-2x per week)  Duration in weeks: 4  Plan of Care beginning date: 1/21/2025  Plan of Care expiration date: 1/21/2025  Treatment plan discussed with: patient        Subjective Evaluation    History of Present Illness  Mechanism of injury: Pt reports since starting therapy pt reports she is roughly 50% improved. Pt reports continued difficulty due to pain and stiffness of the R hip. Pt reports continued pain when standing in SLB on the R LE. Pt additionally reports she is negotiating stairs reciprocally about 70% of the time, notes ascending > descending. Pt reports overall she is having less pain.  "    Patient Goals  Patient goals for therapy: decreased pain, independence with ADLs/IADLs, increased strength and return to sport/leisure activities  Patient goal: be able to stand up with less pain (in progress)  Pain  Current pain ratin  At best pain ratin  At worst pain ratin  Location: R hip flexors  Quality: throbbing and dull ache  Relieving factors: rest (when sitting prolonged taking a few steps, when walking too long sitting for a few minutes)  Aggravating factors: sitting, stair climbing and standing (squatting/ deadlifting)  Progression: improved    Social Support  Steps to enter house: yes (1-2)  Stairs in house: yes (8)     Employment status: working    Diagnostic Tests    FCE comments: 10/9/24 FINDINGS: R HIP XRAY     There is no acute fracture or dislocation.  Mild osteophytosis in the right hip. The left hip is unremarkable  No lytic or blastic osseous lesion.  Soft tissues are unremarkable.  The visualized lumbar spine is unremarkable.     IMPRESSION:     Mild right hip degenerative changes.       Treatments  No previous or current treatments        Objective     Functional Assessment        Comments  RE: 26  -  STS: x11 from standard height chair w/o UE support; 0/10 pain R hip  - SLB: 10\" b/l; R hip pain \"pulling\" 1/10      Posture: fair    Palpation: tenderness noted along right piriformis, glute med, IT Band, and hip flexors    Lumbar AROM:  Flexion: WFL \"with stretch\"  Extension: WFL \"discomfort in L low back\"  R Rotation: WFL  L Rotation: WFL  R Side Bending: WFL  L Side Bending: WFL        Right Hip AROM  Flexion: 90 deg pain 2/10 along the hip flexors and ITB  Internal Rotation (90/90) : 25 deg  External Rotation (90/90) : 25 deg  Abduction: 22 deg  Extension: 20 deg    Left Hip AROM  Flexion: 105 deg  Internal Rotation (90/90) : 23 deg  External Rotation (90/90) : 24 deg  Abduction: 25 deg  Extension: 20 deg    Lower Extremity Strength  Right   Hip Flexion: 5/5   Hip " "Extension: 4/5  Hip Abduction: 4/5   Hip ADD: 4-/5  Hip IR (90/90): 4/5  Hip ER (90/90): 4/5  Knee Extension: 5/5   Knee Flexion: 5/5   Ankle DF: 5/5     Left  Hip Flexion: 5/5   Hip Extension: 4+/5  Hip Abduction: 4+/5   Hip ADD: 4-/5  Hip IR (90/90): 5/5  Hip ER (90/90): 4+/5  Knee Extension: 5/5   Knee Flexion: 5/5   Ankle DF: 5/5       Precautions: n/a        Manuals 12/4 12/9 12/17 12/23 12/30 1/15 1/21      Piriformis str  SK RR CRR         MWM Hip IR/ER, Flex  SK    SK       Lat Hip Distraction with belt  SK RR CRR SK   nv      Hip distraction/ post mob   RR  SK  nv      Scott L-tx with belt    CRR         ITB Stretch      SK                    Neuro Re-Ed             R Sciatic N Glide Seated HEP x10     X10 ea       TA Brace  5\"x15 5\"x15 5\"x15 5\"x20        TA + March  2x10 10x5\" holds ea b/l 15x x15 ea        TA + Hip Add Iso  5\"x20 5\"x20 20x   Hold 5\" 5\"x20        LTR    5x L/R 10ld 5\" 5\" x10 ea        Standing hip 3 way     10x ea bilat 10x ea bilat X10 ea b/l      Quad stretch        Supine with stretch strap 3x30\" holds ea       ITB stretch       Supine with stretch strap 3x30\" holds ea       Butterfly stretch        3x30\" holds ea                    Pt Edu      SK RR                   Ther Ex             Nustep  6' L1 10' L2 10' L4 10' L4 10' L4 10' L4      Supine Piriformis Str HEP 30\"x3 Seated 3x30\" holds ea  3 x 30\" L/R Seated 30\"x3 ea Seated 30\"x3 ea       Clamshell HEP 2x10 x20 20xL/R 20x ea 30x ea       Bridge  5\"x15 5\"x15 5\"  20x 5\" 2x10 5\" 3x10       Reverse Clam  2x10 x20 20x L/R 20x ea 30x ea       Sidelying hip Abd  2x10 x20 20x L/R 20x ea 20x ea       Side lying ADD       X10 ea                                 Ther Activity             Step Up Fwd  4\"x10 4\"x10 > 6\" x10  1-x 4\" L/R  Pain R     4\" x10 ea 6\"  X10 ea       Step Up Lat  4\"x10 6\"x10 10x 4\"    L/R 4\"x10 ea 4\" x10 ea        Step Down Fwd      4\" x10 ea       Gait Training                                       Modalities           "

## 2025-01-30 ENCOUNTER — OFFICE VISIT (OUTPATIENT)
Age: 60
End: 2025-01-30
Payer: COMMERCIAL

## 2025-01-30 DIAGNOSIS — M25.551 RIGHT HIP PAIN: Primary | ICD-10-CM

## 2025-01-30 PROCEDURE — 97112 NEUROMUSCULAR REEDUCATION: CPT | Performed by: PHYSICAL THERAPIST

## 2025-01-30 PROCEDURE — 97110 THERAPEUTIC EXERCISES: CPT | Performed by: PHYSICAL THERAPIST

## 2025-01-30 NOTE — PROGRESS NOTES
"Daily Note     Today's date: 2025  Patient name: Akila Loera  : 1965  MRN: [id number]  Referring provider: Lea Yoon DO  Dx:   Encounter Diagnosis     ICD-10-CM    1. Right hip pain  M25.551           Start Time: 0900  Stop Time: 945  Total time in clinic (min): 45 minutes    Subjective: Patient noted that the stretches have helped, however she continues to have tightness in the R LE from the lateral thigh to the calf.       Objective: See treatment diary below      Assessment: Patient performed Nustep aerobic exercise to increase blood flow to the area being treated, prepare the muscles for strength training and stretching, improve overall tolerance to activity, and aerobic endurance. Patient performed stretches with min VCS for form. PT introduced hip hikes and walking exercises to assist c stabilization of the pelvis during ambulation. Patient noted increased difficulty with hip hikes on the R, however after modifications, this improved. PT add to her HEP. Patient would benefit from continued PT to allow the patient to return to her PLOF.         Plan: Continue per plan of care.  Patient is going to perform her HEP for a little and follow up as needed.      Precautions: n/a        Manuals 12/4 12/9 12/17 12/23 12/30 1/15 1/21 1/30     Piriformis str  SK RR CRR         MWM Hip IR/ER, Flex  SK    SK       Lat Hip Distraction with belt  SK RR CRR SK   nv      Hip distraction/ post mob   RR  SK  nv      Scott L-tx with belt    CRR         ITB Stretch      SK                    Neuro Re-Ed             R Sciatic N Glide Seated HEP x10     X10 ea       TA Brace  5\"x15 5\"x15 5\"x15 5\"x20        TA + March  2x10 10x5\" holds ea b/l 15x x15 ea        TA + Hip Add Iso  5\"x20 5\"x20 20x   Hold 5\" 5\"x20        LTR    5x L/R 10ld 5\" 5\" x10 ea        Standing hip 3 way     10x ea bilat 10x ea bilat X10 ea b/l X15 ea b/l     Quad stretch        Supine with stretch strap 3x30\" holds ea  Supine with stretch strap " "3x30\" holds ea      ITB stretch       Supine with stretch strap 3x30\" holds ea  Supine with stretch strap 3x30\" holds ea      Butterfly stretch        3x30\" holds ea  3x30\" holds ea      Lateral stepping        2 laps     Diagonal stepping        2 laps     Hip hikes        X15 each     Pt Edu      SK RR MW                  Ther Ex             Nustep  6' L1 10' L2 10' L4 10' L4 10' L4 10' L4 10' L4     Supine Piriformis Str HEP 30\"x3 Seated 3x30\" holds ea  3 x 30\" L/R Seated 30\"x3 ea Seated 30\"x3 ea  Supine  3x30\" ea     Clamshell HEP 2x10 x20 20xL/R 20x ea 30x ea       Bridge  5\"x15 5\"x15 5\"  20x 5\" 2x10 5\" 3x10       Reverse Clam  2x10 x20 20x L/R 20x ea 30x ea       Sidelying hip Abd  2x10 x20 20x L/R 20x ea 20x ea       Side lying ADD       X10 ea                                 Ther Activity             Step Up Fwd  4\"x10 4\"x10 > 6\" x10  1-x 4\" L/R  Pain R     4\" x10 ea 6\"  X10 ea       Step Up Lat  4\"x10 6\"x10 10x 4\"    L/R 4\"x10 ea 4\" x10 ea        Step Down Fwd      4\" x10 ea       Gait Training                                       Modalities                                              "

## 2025-02-10 ENCOUNTER — TELEPHONE (OUTPATIENT)
Dept: LAB | Facility: HOSPITAL | Age: 60
End: 2025-02-10

## 2025-04-16 ENCOUNTER — OFFICE VISIT (OUTPATIENT)
Dept: FAMILY MEDICINE CLINIC | Facility: CLINIC | Age: 60
End: 2025-04-16
Payer: COMMERCIAL

## 2025-04-16 ENCOUNTER — APPOINTMENT (OUTPATIENT)
Dept: RADIOLOGY | Facility: CLINIC | Age: 60
End: 2025-04-16
Payer: COMMERCIAL

## 2025-04-16 VITALS
TEMPERATURE: 98 F | HEART RATE: 85 BPM | DIASTOLIC BLOOD PRESSURE: 80 MMHG | HEIGHT: 63 IN | OXYGEN SATURATION: 96 % | SYSTOLIC BLOOD PRESSURE: 126 MMHG | BODY MASS INDEX: 34.3 KG/M2 | WEIGHT: 193.6 LBS

## 2025-04-16 DIAGNOSIS — M54.50 ACUTE BILATERAL LOW BACK PAIN WITHOUT SCIATICA: ICD-10-CM

## 2025-04-16 DIAGNOSIS — M54.9 MID BACK PAIN: Primary | ICD-10-CM

## 2025-04-16 DIAGNOSIS — M54.9 MID BACK PAIN: ICD-10-CM

## 2025-04-16 PROCEDURE — 72072 X-RAY EXAM THORAC SPINE 3VWS: CPT

## 2025-04-16 PROCEDURE — 99213 OFFICE O/P EST LOW 20 MIN: CPT | Performed by: FAMILY MEDICINE

## 2025-04-16 RX ORDER — CYCLOBENZAPRINE HCL 5 MG
5 TABLET ORAL 3 TIMES DAILY PRN
Qty: 90 TABLET | Refills: 1 | Status: SHIPPED | OUTPATIENT
Start: 2025-04-16

## 2025-04-16 NOTE — PROGRESS NOTES
"Name: Akila Loera      : 1965      MRN: [id number]  Encounter Provider: Lea Yoon DO  Encounter Date: 2025   Encounter department: [facility]  :  Assessment & Plan  Mid back pain    Orders:  •  XR spine thoracic 3 vw; Future    Acute bilateral low back pain without sciatica    Orders:  •  cyclobenzaprine (FLEXERIL) 5 mg tablet; Take 1 tablet (5 mg total) by mouth 3 (three) times a day as needed for muscle spasms           History of Present Illness   Patient with mid-back pain radiating around upper abdomen more on the right than left. She has been using muscle relaxers.  She is still walking. Walking does aggravate her symptoms.  Does feel like her ribs move and pop when she bends.  Does have increased GERD.  Is taking NuDapt supplement and metformin from GYN - Dr. Lovell.    Back Pain      Review of Systems   Musculoskeletal:  Positive for back pain.       Objective   /80 (BP Location: Left arm, Patient Position: Sitting, Cuff Size: Large)   Pulse 85   Temp 98 °F (36.7 °C) (Temporal)   Ht 5' 3\" (1.6 m)   Wt 87.8 kg (193 lb 9.6 oz)   LMP 2018   SpO2 96%   BMI 34.29 kg/m²      Physical Exam  Constitutional:       General: She is not in acute distress.  HENT:      Head: Normocephalic.   Musculoskeletal:         General: No tenderness.      Comments: Muscle spasm. Decreased range of motion.   Neurological:      Mental Status: She is alert.         "

## 2025-04-24 ENCOUNTER — RESULTS FOLLOW-UP (OUTPATIENT)
Dept: FAMILY MEDICINE CLINIC | Facility: CLINIC | Age: 60
End: 2025-04-24